# Patient Record
Sex: FEMALE | Race: WHITE | NOT HISPANIC OR LATINO | URBAN - METROPOLITAN AREA
[De-identification: names, ages, dates, MRNs, and addresses within clinical notes are randomized per-mention and may not be internally consistent; named-entity substitution may affect disease eponyms.]

---

## 2017-01-03 ENCOUNTER — INPATIENT (INPATIENT)
Facility: HOSPITAL | Age: 27
LOS: 2 days | Discharge: HOME | DRG: 918 | End: 2017-01-06
Attending: INTERNAL MEDICINE | Admitting: INTERNAL MEDICINE
Payer: COMMERCIAL

## 2017-01-03 VITALS
DIASTOLIC BLOOD PRESSURE: 98 MMHG | HEART RATE: 108 BPM | SYSTOLIC BLOOD PRESSURE: 159 MMHG | TEMPERATURE: 99 F | RESPIRATION RATE: 26 BRPM | OXYGEN SATURATION: 96 %

## 2017-01-03 DIAGNOSIS — Z86.59 PERSONAL HISTORY OF OTHER MENTAL AND BEHAVIORAL DISORDERS: ICD-10-CM

## 2017-01-03 DIAGNOSIS — G54.0 BRACHIAL PLEXUS DISORDERS: ICD-10-CM

## 2017-01-03 DIAGNOSIS — T43.621A POISONING BY AMPHETAMINES, ACCIDENTAL (UNINTENTIONAL), INITIAL ENCOUNTER: ICD-10-CM

## 2017-01-03 DIAGNOSIS — F19.90 OTHER PSYCHOACTIVE SUBSTANCE USE, UNSPECIFIED, UNCOMPLICATED: ICD-10-CM

## 2017-01-03 DIAGNOSIS — Z41.8 ENCOUNTER FOR OTHER PROCEDURES FOR PURPOSES OTHER THAN REMEDYING HEALTH STATE: ICD-10-CM

## 2017-01-03 DIAGNOSIS — I82.629 ACUTE EMBOLISM AND THROMBOSIS OF DEEP VEINS OF UNSPECIFIED UPPER EXTREMITY: ICD-10-CM

## 2017-01-03 DIAGNOSIS — R63.8 OTHER SYMPTOMS AND SIGNS CONCERNING FOOD AND FLUID INTAKE: ICD-10-CM

## 2017-01-03 DIAGNOSIS — R00.0 TACHYCARDIA, UNSPECIFIED: ICD-10-CM

## 2017-01-03 DIAGNOSIS — F19.950 OTHER PSYCHOACTIVE SUBSTANCE USE, UNSPECIFIED WITH PSYCHOACTIVE SUBSTANCE-INDUCED PSYCHOTIC DISORDER WITH DELUSIONS: ICD-10-CM

## 2017-01-03 LAB
ALBUMIN SERPL ELPH-MCNC: 4.1 G/DL — SIGNIFICANT CHANGE UP (ref 3.4–5)
ALBUMIN SERPL ELPH-MCNC: 4.6 G/DL — SIGNIFICANT CHANGE UP (ref 3.4–5)
ALP SERPL-CCNC: 82 U/L — SIGNIFICANT CHANGE UP (ref 40–120)
ALP SERPL-CCNC: 84 U/L — SIGNIFICANT CHANGE UP (ref 40–120)
ALT FLD-CCNC: 29 U/L — SIGNIFICANT CHANGE UP (ref 12–42)
ALT FLD-CCNC: 36 U/L — SIGNIFICANT CHANGE UP (ref 12–42)
ANION GAP SERPL CALC-SCNC: 12 MMOL/L — SIGNIFICANT CHANGE UP (ref 9–16)
ANION GAP SERPL CALC-SCNC: 8 MMOL/L — LOW (ref 9–16)
APPEARANCE UR: CLEAR — SIGNIFICANT CHANGE UP
APTT BLD: 29.1 SEC — SIGNIFICANT CHANGE UP (ref 27.5–37.4)
AST SERPL-CCNC: 21 U/L — SIGNIFICANT CHANGE UP (ref 15–37)
AST SERPL-CCNC: 33 U/L — SIGNIFICANT CHANGE UP (ref 15–37)
BASOPHILS NFR BLD AUTO: 0.5 % — SIGNIFICANT CHANGE UP (ref 0–2)
BILIRUB SERPL-MCNC: 0.4 MG/DL — SIGNIFICANT CHANGE UP (ref 0.2–1.2)
BILIRUB SERPL-MCNC: 1 MG/DL — SIGNIFICANT CHANGE UP (ref 0.2–1.2)
BILIRUB UR-MCNC: NEGATIVE — SIGNIFICANT CHANGE UP
BUN SERPL-MCNC: 4 MG/DL — LOW (ref 7–23)
BUN SERPL-MCNC: 5 MG/DL — LOW (ref 7–23)
CALCIUM SERPL-MCNC: 7.8 MG/DL — LOW (ref 8.5–10.5)
CALCIUM SERPL-MCNC: 9.5 MG/DL — SIGNIFICANT CHANGE UP (ref 8.5–10.5)
CHLORIDE SERPL-SCNC: 105 MMOL/L — SIGNIFICANT CHANGE UP (ref 96–108)
CHLORIDE SERPL-SCNC: 105 MMOL/L — SIGNIFICANT CHANGE UP (ref 96–108)
CO2 SERPL-SCNC: 22 MMOL/L — SIGNIFICANT CHANGE UP (ref 22–31)
CO2 SERPL-SCNC: 25 MMOL/L — SIGNIFICANT CHANGE UP (ref 22–31)
COLOR SPEC: YELLOW — SIGNIFICANT CHANGE UP
CREAT SERPL-MCNC: 0.68 MG/DL — SIGNIFICANT CHANGE UP (ref 0.5–1.3)
CREAT SERPL-MCNC: 0.7 MG/DL — SIGNIFICANT CHANGE UP (ref 0.5–1.3)
DIFF PNL FLD: (no result)
EOSINOPHIL NFR BLD AUTO: 0.5 % — SIGNIFICANT CHANGE UP (ref 0–6)
GLUCOSE SERPL-MCNC: 118 MG/DL — HIGH (ref 70–99)
GLUCOSE SERPL-MCNC: 133 MG/DL — HIGH (ref 70–99)
GLUCOSE UR QL: NEGATIVE — SIGNIFICANT CHANGE UP
HCG SERPL-ACNC: <1 MIU/ML — SIGNIFICANT CHANGE UP
HCT VFR BLD CALC: 43.5 % — SIGNIFICANT CHANGE UP (ref 34.5–45)
HGB BLD-MCNC: 14.8 G/DL — SIGNIFICANT CHANGE UP (ref 11.5–15.5)
INR BLD: 1.14 — SIGNIFICANT CHANGE UP (ref 0.88–1.16)
KETONES UR-MCNC: NEGATIVE — SIGNIFICANT CHANGE UP
LACTATE SERPL-SCNC: 2.4 MMOL/L — HIGH (ref 0.5–2)
LEUKOCYTE ESTERASE UR-ACNC: NEGATIVE — SIGNIFICANT CHANGE UP
LYMPHOCYTES # BLD AUTO: 24.3 % — SIGNIFICANT CHANGE UP (ref 13–44)
MAGNESIUM SERPL-MCNC: 2.2 MG/DL — SIGNIFICANT CHANGE UP (ref 1.6–2.4)
MCHC RBC-ENTMCNC: 29 PG — SIGNIFICANT CHANGE UP (ref 27–34)
MCHC RBC-ENTMCNC: 34 G/DL — SIGNIFICANT CHANGE UP (ref 32–36)
MCV RBC AUTO: 85.1 FL — SIGNIFICANT CHANGE UP (ref 80–100)
MONOCYTES NFR BLD AUTO: 10.2 % — SIGNIFICANT CHANGE UP (ref 2–14)
NEUTROPHILS NFR BLD AUTO: 64.5 % — SIGNIFICANT CHANGE UP (ref 43–77)
NITRITE UR-MCNC: NEGATIVE — SIGNIFICANT CHANGE UP
PCP SPEC-MCNC: SIGNIFICANT CHANGE UP
PH UR: 7 — SIGNIFICANT CHANGE UP (ref 4–8)
PLATELET # BLD AUTO: 514 K/UL — HIGH (ref 150–400)
POTASSIUM SERPL-MCNC: 3.3 MMOL/L — LOW (ref 3.5–5.3)
POTASSIUM SERPL-MCNC: 3.6 MMOL/L — SIGNIFICANT CHANGE UP (ref 3.5–5.3)
POTASSIUM SERPL-SCNC: 3.3 MMOL/L — LOW (ref 3.5–5.3)
POTASSIUM SERPL-SCNC: 3.6 MMOL/L — SIGNIFICANT CHANGE UP (ref 3.5–5.3)
PROT SERPL-MCNC: 7.5 G/DL — SIGNIFICANT CHANGE UP (ref 6.4–8.2)
PROT SERPL-MCNC: 8.7 G/DL — HIGH (ref 6.4–8.2)
PROT UR-MCNC: NEGATIVE MG/DL — SIGNIFICANT CHANGE UP
PROTHROM AB SERPL-ACNC: 12.7 SEC — SIGNIFICANT CHANGE UP (ref 10–13.1)
RBC # BLD: 5.11 M/UL — SIGNIFICANT CHANGE UP (ref 3.8–5.2)
RBC # FLD: 12.9 % — SIGNIFICANT CHANGE UP (ref 10.3–16.9)
SODIUM SERPL-SCNC: 138 MMOL/L — SIGNIFICANT CHANGE UP (ref 135–145)
SODIUM SERPL-SCNC: 139 MMOL/L — SIGNIFICANT CHANGE UP (ref 135–145)
SP GR SPEC: 1.02 — SIGNIFICANT CHANGE UP (ref 1–1.03)
T4 AB SER-ACNC: 6.84 UG/DL — SIGNIFICANT CHANGE UP (ref 3.17–11.72)
TSH SERPL-MCNC: 3.34 UIU/ML — SIGNIFICANT CHANGE UP (ref 0.35–4.94)
TSH SERPL-MCNC: 4.38 UIU/ML — SIGNIFICANT CHANGE UP (ref 0.35–4.94)
UROBILINOGEN FLD QL: 0.2 E.U./DL — SIGNIFICANT CHANGE UP
WBC # BLD: 10.9 K/UL — HIGH (ref 3.8–10.5)
WBC # FLD AUTO: 10.9 K/UL — HIGH (ref 3.8–10.5)

## 2017-01-03 PROCEDURE — 99291 CRITICAL CARE FIRST HOUR: CPT | Mod: 25

## 2017-01-03 PROCEDURE — 71275 CT ANGIOGRAPHY CHEST: CPT | Mod: 26

## 2017-01-03 PROCEDURE — 99223 1ST HOSP IP/OBS HIGH 75: CPT | Mod: GC

## 2017-01-03 PROCEDURE — 93010 ELECTROCARDIOGRAM REPORT: CPT

## 2017-01-03 PROCEDURE — 71010: CPT | Mod: 26

## 2017-01-03 PROCEDURE — 99223 1ST HOSP IP/OBS HIGH 75: CPT

## 2017-01-03 RX ORDER — HEPARIN SODIUM 5000 [USP'U]/ML
5000 INJECTION INTRAVENOUS; SUBCUTANEOUS EVERY 8 HOURS
Qty: 0 | Refills: 0 | Status: DISCONTINUED | OUTPATIENT
Start: 2017-01-03 | End: 2017-01-06

## 2017-01-03 RX ORDER — INFLUENZA VIRUS VACCINE 15; 15; 15; 15 UG/.5ML; UG/.5ML; UG/.5ML; UG/.5ML
0.5 SUSPENSION INTRAMUSCULAR ONCE
Qty: 0 | Refills: 0 | Status: COMPLETED | OUTPATIENT
Start: 2017-01-03 | End: 2017-01-03

## 2017-01-03 RX ORDER — POTASSIUM CHLORIDE 20 MEQ
40 PACKET (EA) ORAL ONCE
Qty: 0 | Refills: 0 | Status: COMPLETED | OUTPATIENT
Start: 2017-01-03 | End: 2017-01-03

## 2017-01-03 RX ORDER — METOPROLOL TARTRATE 50 MG
2.5 TABLET ORAL ONCE
Qty: 0 | Refills: 0 | Status: COMPLETED | OUTPATIENT
Start: 2017-01-03 | End: 2017-01-03

## 2017-01-03 RX ORDER — SODIUM CHLORIDE 9 MG/ML
1000 INJECTION INTRAMUSCULAR; INTRAVENOUS; SUBCUTANEOUS
Qty: 0 | Refills: 0 | Status: DISCONTINUED | OUTPATIENT
Start: 2017-01-03 | End: 2017-01-05

## 2017-01-03 RX ORDER — QUETIAPINE FUMARATE 200 MG/1
50 TABLET, FILM COATED ORAL EVERY 6 HOURS
Qty: 0 | Refills: 0 | Status: DISCONTINUED | OUTPATIENT
Start: 2017-01-03 | End: 2017-01-06

## 2017-01-03 RX ORDER — METOPROLOL TARTRATE 50 MG
5 TABLET ORAL EVERY 6 HOURS
Qty: 0 | Refills: 0 | Status: DISCONTINUED | OUTPATIENT
Start: 2017-01-03 | End: 2017-01-04

## 2017-01-03 RX ORDER — ATENOLOL 25 MG/1
0 TABLET ORAL
Qty: 0 | Refills: 0 | COMMUNITY

## 2017-01-03 RX ORDER — SERTRALINE 25 MG/1
0 TABLET, FILM COATED ORAL
Qty: 0 | Refills: 0 | COMMUNITY

## 2017-01-03 RX ORDER — QUETIAPINE FUMARATE 200 MG/1
50 TABLET, FILM COATED ORAL EVERY 6 HOURS
Qty: 0 | Refills: 0 | Status: DISCONTINUED | OUTPATIENT
Start: 2017-01-03 | End: 2017-01-03

## 2017-01-03 RX ORDER — SODIUM CHLORIDE 9 MG/ML
2000 INJECTION INTRAMUSCULAR; INTRAVENOUS; SUBCUTANEOUS ONCE
Qty: 0 | Refills: 0 | Status: COMPLETED | OUTPATIENT
Start: 2017-01-03 | End: 2017-01-03

## 2017-01-03 RX ORDER — SODIUM CHLORIDE 9 MG/ML
1000 INJECTION INTRAMUSCULAR; INTRAVENOUS; SUBCUTANEOUS ONCE
Qty: 0 | Refills: 0 | Status: COMPLETED | OUTPATIENT
Start: 2017-01-03 | End: 2017-01-03

## 2017-01-03 RX ORDER — ONDANSETRON 8 MG/1
4 TABLET, FILM COATED ORAL ONCE
Qty: 0 | Refills: 0 | Status: COMPLETED | OUTPATIENT
Start: 2017-01-03 | End: 2017-01-03

## 2017-01-03 RX ORDER — QUETIAPINE FUMARATE 200 MG/1
0 TABLET, FILM COATED ORAL
Qty: 0 | Refills: 0 | COMMUNITY

## 2017-01-03 RX ORDER — QUETIAPINE FUMARATE 200 MG/1
25 TABLET, FILM COATED ORAL EVERY 6 HOURS
Qty: 0 | Refills: 0 | Status: DISCONTINUED | OUTPATIENT
Start: 2017-01-03 | End: 2017-01-03

## 2017-01-03 RX ADMIN — Medication 0.5 MILLIGRAM(S): at 22:37

## 2017-01-03 RX ADMIN — QUETIAPINE FUMARATE 25 MILLIGRAM(S): 200 TABLET, FILM COATED ORAL at 14:39

## 2017-01-03 RX ADMIN — SODIUM CHLORIDE 4000 MILLILITER(S): 9 INJECTION INTRAMUSCULAR; INTRAVENOUS; SUBCUTANEOUS at 03:17

## 2017-01-03 RX ADMIN — SODIUM CHLORIDE 120 MILLILITER(S): 9 INJECTION INTRAMUSCULAR; INTRAVENOUS; SUBCUTANEOUS at 21:54

## 2017-01-03 RX ADMIN — Medication 40 MILLIEQUIVALENT(S): at 15:36

## 2017-01-03 RX ADMIN — Medication 40 MILLIEQUIVALENT(S): at 19:59

## 2017-01-03 RX ADMIN — SODIUM CHLORIDE 120 MILLILITER(S): 9 INJECTION INTRAMUSCULAR; INTRAVENOUS; SUBCUTANEOUS at 12:56

## 2017-01-03 RX ADMIN — HEPARIN SODIUM 5000 UNIT(S): 5000 INJECTION INTRAVENOUS; SUBCUTANEOUS at 13:48

## 2017-01-03 RX ADMIN — Medication 2.5 MILLIGRAM(S): at 15:35

## 2017-01-03 RX ADMIN — Medication 5 MILLIGRAM(S): at 22:00

## 2017-01-03 RX ADMIN — Medication 1 MILLIGRAM(S): at 03:15

## 2017-01-03 RX ADMIN — Medication 0.5 MILLIGRAM(S): at 17:41

## 2017-01-03 RX ADMIN — Medication 2.5 MILLIGRAM(S): at 16:29

## 2017-01-03 RX ADMIN — QUETIAPINE FUMARATE 50 MILLIGRAM(S): 200 TABLET, FILM COATED ORAL at 22:40

## 2017-01-03 RX ADMIN — Medication 1 MILLIGRAM(S): at 05:23

## 2017-01-03 RX ADMIN — ONDANSETRON 4 MILLIGRAM(S): 8 TABLET, FILM COATED ORAL at 05:19

## 2017-01-03 RX ADMIN — SODIUM CHLORIDE 2000 MILLILITER(S): 9 INJECTION INTRAMUSCULAR; INTRAVENOUS; SUBCUTANEOUS at 06:39

## 2017-01-03 RX ADMIN — Medication 1 MILLIGRAM(S): at 19:59

## 2017-01-03 RX ADMIN — HEPARIN SODIUM 5000 UNIT(S): 5000 INJECTION INTRAVENOUS; SUBCUTANEOUS at 22:00

## 2017-01-03 NOTE — PATIENT PROFILE ADULT. - PROVIDE DETAILS
"My neighbors, they're just crazy. They tried to get me involved in money laundering. They're just drug dealers."

## 2017-01-03 NOTE — CONSULT NOTE ADULT - PROBLEM SELECTOR RECOMMENDATION 2
Patient with hx of illicit substance use and now with paranoia likely 2/2 amphetamine.   - Recommend psychiatry consult for evaluation

## 2017-01-03 NOTE — ED PROVIDER NOTE - NEUROLOGICAL, MLM
Alert and oriented, no focal deficits, no motor or sensory deficits. Alert and oriented, no focal deficits, no motor or sensory deficits. No clonus.  DTR: BR and biceps + 2 bilat; Patellar and Achilles + 4 bilaterally

## 2017-01-03 NOTE — CONSULT NOTE ADULT - ATTENDING COMMENTS
Ms Pinon is a 27 y/o female who presented to the ED after using estacy, marijuana and alcohol after which she had tingling around the mouth and shortness of breath  A/P  -depression  -recreational drug abuse  -sinus tachycardia  -paranoia  >haldol prn agitation  >psych evaluation  >hold seroquel  >hold xoloft  >IVF  >she may eat  DVT prophylaxis  >[facility] for close observation

## 2017-01-03 NOTE — ED PROVIDER NOTE - ATTENDING CONTRIBUTION TO CARE
26F hx thoracic outlet syndrome (s/p surgical removal of cervical rib), LUE DVT (not currently on anticoagulation), c/o palpitations, SOB. pt states she took an ecstasy tablet.  states feels like it is difficult to take a deep breath.  no LE swelling. no n/v/d. no fevers.   gen- nad  heent- ncat, clear conj, pupils large, equal reactive 7mm  cv -tachycardic  lungs -ctab  abd - soft, nt, nd  ext -wwp, no edema  neuro -aox3, steady gait, carlton  anxious appearing, tachycardic 100s-160s, however sinus on EKG, no PE on CT scan, labs checked, tachycardia improved with ativan, likely toxic reaction, will continue iv hydration and monitoring.

## 2017-01-03 NOTE — ED PROVIDER NOTE - MUSCULOSKELETAL, MLM
Spine appears normal, range of motion is not limited, no muscle or joint tenderness.  No upper or lower extremity edema.

## 2017-01-03 NOTE — PATIENT PROFILE ADULT. - NS TRANSFER PATIENT BELONGINGS
Clothing/iPhone 6, wallet, $200 cash, purse, bracelet/Jewelry/Money (specify)/Cell Phone/PDA (specify)

## 2017-01-03 NOTE — H&P ADULT. - ASSESSMENT
26F with a medical history of thoracic outlet syndrome (s/p pectoral resection and cervical rib resection, 11/16), unspecified anxiety disorder, major depressive disorder (on Zoloft, reported Seroquel to another provider), DVT of the upper extremity, and recreational substance use who presented to [facility] for evaluation of shortness of breath and palpitations, likely secondary to amphetamine overdose.

## 2017-01-03 NOTE — ED PROVIDER NOTE - CONSTITUTIONAL, MLM
normal... Well appearing, well nourished, awake, alert, oriented to person, place, time/situation. Very anxious, but NAD

## 2017-01-03 NOTE — H&P ADULT. - PROBLEM SELECTOR PLAN 5
regular diet  replete electrolytes as dictated by laboratory results    DISPO: pending clinical improvement  FULL CODE

## 2017-01-03 NOTE — PROGRESS NOTE ADULT - ASSESSMENT
IMP:  26-year-old female with hx of depression though denies prior inpt admits, suicidality or substance abuse hx, now admitted s/p ingestion of multiple drugs including   DX:    REC:  Observation Status:  Additional Work-up:  Medication Recs:  Follow-up: IMP:  26-year-old female with hx of depression though denies prior inpt admits, suicidality or substance abuse hx, now admitted s/p ingestion of multiple drugs including ecstasy and MJ  DX:    REC:  Observation Status:  Additional Work-up:  Medication Recs:  Follow-up: IMP:  26-year-old female with hx of depression though denies prior inpt admits, suicidality or substance abuse hx, now admitted s/p stated ingestion of ecstasy, MJ, and cocaine (though utox + only for amphetamines), with paranoid delusions, auditory hallucinations and agitation. Pt's current presentation likely substance-induced, due to ecstasy and perhaps other undetectable substances as well.        REC:  Observation Status:  Additional Work-up:  Medication Recs:  Follow-up: IMP:  26-year-old female with hx of depression though denies prior inpt admits, suicidality or substance abuse hx, now admitted s/p stated ingestion of ecstasy, MJ, and cocaine (though utox + only for amphetamines), with paranoid delusions, auditory hallucinations and agitation. Pt's current presentation likely substance-induced, due to ecstasy and perhaps other undetectable substances as well.

## 2017-01-03 NOTE — CONSULT NOTE ADULT - ASSESSMENT
A/P: 26 year-old F with PMHx of thoracic outlet syndrome, s/p pectoral resection and cervical rib resection in Nov 2016, anxiety, depression, substance abuse, reportedly took "Ecstasy", U tox positive for amphetamine and benzos (was given Ativan in ED), ICU consulted for tachycardia and concerning serotonin syndrome.

## 2017-01-03 NOTE — CONSULT NOTE ADULT - PROBLEM SELECTOR RECOMMENDATION 9
EKG with sinus tachycardia. Sympathomimetic response likely 2/2 Amphetamine in a patient with hx of illicit substance use. Cannot r/o other drugs use despite U tox only positive for Benzodiazepines and Amphetamines. Also concerning for serotonin syndrome as patient takes Zoloft, however patient is not hyperthermic, no tremors or clonus observed on exam. No chest pain or dyspnea.   - Recommend monitoring patient in [place].  - Continue with IVF.  - Hold Zoloft and Seroquel.  - If agitated can give Haldol PRN

## 2017-01-03 NOTE — H&P ADULT. - PROBLEM SELECTOR PLAN 2
Reported operations in past two months to relieve obstruction. Stable at time.  - follow up with her provider in [place] re: history

## 2017-01-03 NOTE — PROGRESS NOTE ADULT - SUBJECTIVE AND OBJECTIVE BOX
HPI:  Ms. Pinon is a 26F, visiting from [place], with a medical history of thoracic outlet syndrome (s/p pectoral resection and cervical rib resection, 11/16), unspecified anxiety disorder, major depressive disorder (on Zoloft, reported Seroquel to another provider), DVT of the upper extremity, and recreational substance use who presented to [facility] for evaluation of shortness of breath and palpitations. According to the patient, she was at a house party (where she consumed alcohol and smoked marijuana) at which point she swallowed a white granular powder that was reported to be ecstasy. Several hours later, she was noted as being "really sweaty" by another person; the patient attempted to lie down, but noticed that she had difficulty breathing in addition to a sensation of her "jaw being locked". No chest pain. At this point, she came to the ED for further evaluation.    In the ED, her vitals were remarkable for tachycardia in 150-160s (sinus tachycardia on EKG). ICU was consulted for possible serotonin syndrome, given the patient takes sertraline as an outpatient. She received 2L NS and Ativan 1mg x2, which reportedly helped alleviate her symptom severity. (03 Jan 2017 11:25)      Psych HPI: Consult requested for evaluation of above, as well as agitation, extreme paranoia and ?hallucinations.    Past Psych Hx: As above. Pt denies prior inpt admits, suicidality. Reports she's been prescribed Zoloft 50 mg po qdaily by her PMD, Dr. Fine, in [place]    PAST MEDICAL & SURGICAL HISTORY:  Thoracic outlet syndrome associated with cervical rib  DVT of upper extremity (deep vein thrombosis)      Allergies    trazodone (Anaphylaxis)    Intolerances      MEDICATIONS  (STANDING):  sodium chloride 0.9%. 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 Never ; no kids.    Sub Abuse Hx: Denies prior use of any drugs, stating this was the first time she used anything.    FPH: Brother: Depression    ROS: Psych: See HPI.  All other systems negative.                          14.8   10.9  )-----------( 514      ( 03 Jan 2017 03:19 )             43.5   03 Jan 2017 18:23    138    |  105    |  4      ----------------------------<  133    3.3     |  25     |  0.70     Ca    7.8        03 Jan 2017 18:23  Mg     2.2       03 Jan 2017 03:19    TPro  7.5    /  Alb  4.1    /  TBili  1.0    /  DBili  x      /  AST  21     /  ALT  29     /  AlkPhos  82     03 Jan 2017 18:23    TSH:     Utox:    Imaging:  Other Tests:        Collateral:    EXAM:  Vital Signs Last 24 Hrs  T(C): 36.9, Max: 37 (01-03 @ 01:57)  T(F): 98.4, Max: 98.6 (01-03 @ 01:57)  HR: 104 (97 - 156)  BP: 147/96 (130/80 - 159/98)  BP(mean): 117 (103 - 127)  RR: 20 (18 - 26)  SpO2: 97% (96% - 100%)    Gen Appearance: Looks stated age. Slightly dishevelled. +contusions on chin. Hyperalert and easily distractible due to paranoia  Gait/Station/Muscle Tone: In bed  Abnl Movements: Restless due to hyperalert and paranoid state.  Speech: Reg rate, vol  TP; Illogical, perseverative re her friends calling her mother, her friends using her computer.  TC: Denies SI/HI. ?auditory hallucinations regarding others talking about her. Denies VH. +++paranoid ideation.  Mood: "Nervous."  Affect: Mood congruent, paranoid, ?internally preoccupied.  Consciousness/orientation: A and O x 4  Memory: Grossly Intact  Recent:Grossly Intact    Remote: Grossly Intact  Attention/Concentration: Easily distractible.  Insight: Poor  Judgment: Poor    Suicide Risk Assessment: Pt denies past or current suicidal ideation/intent or plan. HPI:  Ms. Pinon is a 26F, visiting from [place], with a medical history of thoracic outlet syndrome (s/p pectoral resection and cervical rib resection, 11/16), unspecified anxiety disorder, major depressive disorder (on Zoloft, reported Seroquel to another provider), DVT of the upper extremity, and recreational substance use who presented to [facility] for evaluation of shortness of breath and palpitations. According to the patient, she was at a house party (where she consumed alcohol and smoked marijuana) at which point she swallowed a white granular powder that was reported to be ecstasy. Several hours later, she was noted as being "really sweaty" by another person; the patient attempted to lie down, but noticed that she had difficulty breathing in addition to a sensation of her "jaw being locked". No chest pain. At this point, she came to the ED for further evaluation.    In the ED, her vitals were remarkable for tachycardia in 150-160s (sinus tachycardia on EKG). ICU was consulted for possible serotonin syndrome, given the patient takes sertraline as an outpatient. She received 2L NS and Ativan 1mg x2, which reportedly helped alleviate her symptom severity. (03 Jan 2017 11:25)      Psych HPI: Consult requested for evaluation of above, as well as agitation, extreme paranoia and ?hallucinations.    On interview now, pt remains agitated, restless and paranoid, despite rx with seroquel 25 mg and lorazepam 1.5 mg. She acknowledges using cocaine, MJ and "Dee Dee" (ecstasy). She is very focused on concern that her friends will contact her mother, also that her friends have hijacked her computer, used her password and are sending messages.     Past Psych Hx: As above. Pt denies prior inpt admits, suicidality. Reports she's been prescribed Zoloft 50 mg po qdaily by her PMD, Dr. Fine, in [place]    PAST MEDICAL & SURGICAL HISTORY:  Thoracic outlet syndrome associated with cervical rib  DVT of upper extremity (deep vein thrombosis)      Allergies    trazodone (Anaphylaxis)    Intolerances      MEDICATIONS  (STANDING):  sodium chloride 0.9%. 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    Sub Abuse Hx: Denies prior use of any drugs, stating this was the first time she used anything.    FPH: Brother: Depression    ROS: Psych: See HPI.  All other systems negative.                          14.8   10.9  )-----------( 514      ( 03 Jan 2017 03:19 )             43.5   03 Jan 2017 18:23    138    |  105    |  4      ----------------------------<  133    3.3     |  25     |  0.70     Ca    7.8        03 Jan 2017 18:23  Mg     2.2       03 Jan 2017 03:19    TPro  7.5    /  Alb  4.1    /  TBili  1.0    /  DBili  x      /  AST  21     /  ALT  29     /  AlkPhos  82     03 Jan 2017 18:23    TSH:     Utox:    Imaging:  Other Tests:        Collateral:    EXAM:  Vital Signs Last 24 Hrs  T(C): 36.9, Max: 37 (01-03 @ 01:57)  T(F): 98.4, Max: 98.6 (01-03 @ 01:57)  HR: 104 (97 - 156)  BP: 147/96 (130/80 - 159/98)  BP(mean): 117 (103 - 127)  RR: 20 (18 - 26)  SpO2: 97% (96% - 100%)    Gen Appearance: Looks stated age. Slightly dishevelled. +contusions on chin. Hyperalert and easily distractible due to paranoia  Gait/Station/Muscle Tone: In bed  Abnl Movements: Restless due to hyperalert and paranoid state.  Speech: Reg rate, vol  TP; Illogical, perseverative re her friends calling her mother, her friends using her computer.  TC: Denies SI/HI. ?auditory hallucinations regarding others talking about her. Denies VH. +++paranoid ideation.  Mood: "Nervous."  Affect: Mood congruent, paranoid, ?internally preoccupied.  Consciousness/orientation: A and O x 4  Memory: Grossly Intact  Recent:Grossly Intact    Remote: Grossly Intact  Attention/Concentration: Easily distractible.  Insight: Poor  Judgment: Poor    Suicide Risk Assessment: Pt denies past or current suicidal ideation/intent or plan. HPI:  Ms. Pinon is a 26F, visiting from [place], with a medical history of thoracic outlet syndrome (s/p pectoral resection and cervical rib resection, 11/16), unspecified anxiety disorder, major depressive disorder (on Zoloft, reported Seroquel to another provider), DVT of the upper extremity, and recreational substance use who presented to [facility] for evaluation of shortness of breath and palpitations. According to the patient, she was at a house party (where she consumed alcohol and smoked marijuana) at which point she swallowed a white granular powder that was reported to be ecstasy. Several hours later, she was noted as being "really sweaty" by another person; the patient attempted to lie down, but noticed that she had difficulty breathing in addition to a sensation of her "jaw being locked". No chest pain. At this point, she came to the ED for further evaluation.    In the ED, her vitals were remarkable for tachycardia in 150-160s (sinus tachycardia on EKG). ICU was consulted for possible serotonin syndrome, given the patient takes sertraline as an outpatient. She received 2L NS and Ativan 1mg x2, which reportedly helped alleviate her symptom severity. (03 Jan 2017 11:25)      Psych HPI: Consult requested for evaluation of above, as well as agitation, extreme paranoia and ?hallucinations.    On interview now, pt remains agitated, restless and paranoid, despite rx with seroquel 25 mg and lorazepam 1.5 mg. She acknowledges using cocaine, MJ and "Dee Dee" (ecstasy). She is very focused on concern that her friends will contact her mother, also that her friends have hijacked her computer, used her password and are sending messages.     Past Psych Hx: As above. Pt denies prior inpt admits, suicidality. Reports she's been prescribed Zoloft 50 mg po qdaily by her PMD, Dr. Fine, in [place]    PAST MEDICAL & SURGICAL HISTORY:  Thoracic outlet syndrome associated with cervical rib  DVT of upper extremity (deep vein thrombosis)      Allergies    trazodone (Anaphylaxis)    Intolerances      MEDICATIONS  (STANDING):  sodium chloride 0.9%. 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    Sub Abuse Hx: Denies prior use of any drugs, stating this was the first time she used anything.    FPH: Brother: Depression    ROS: Psych: See HPI.  All other systems negative.                          14.8   10.9  )-----------( 514      ( 03 Jan 2017 03:19 )             43.5   03 Jan 2017 18:23    138    |  105    |  4      ----------------------------<  133    3.3     |  25     |  0.70     Ca    7.8        03 Jan 2017 18:23  Mg     2.2       03 Jan 2017 03:19    TPro  7.5    /  Alb  4.1    /  TBili  1.0    /  DBili  x      /  AST  21     /  ALT  29     /  AlkPhos  82     03 Jan 2017 18:23    Utox: + benzos and amphetamines    Imaging:  Other Tests:        Collateral:    EXAM:  Vital Signs Last 24 Hrs  T(C): 36.9, Max: 37 (01-03 @ 01:57)  T(F): 98.4, Max: 98.6 (01-03 @ 01:57)  HR: 104 (97 - 156)  BP: 147/96 (130/80 - 159/98)  BP(mean): 117 (103 - 127)  RR: 20 (18 - 26)  SpO2: 97% (96% - 100%)    Gen Appearance: Looks stated age. Slightly dishevelled. +contusions on chin. Hyperalert and easily distractible due to paranoia  Gait/Station/Muscle Tone: In bed  Abnl Movements: Restless due to hyperalert and paranoid state.  Speech: Reg rate, vol  TP; Illogical, perseverative re her friends calling her mother, her friends using her computer.  TC: Denies SI/HI. ?auditory hallucinations regarding others talking about her. Denies VH. +++paranoid ideation.  Mood: "Nervous."  Affect: Mood congruent, paranoid, ?internally preoccupied.  Consciousness/orientation: A and O x 4  Memory: Grossly Intact  Recent:Grossly Intact    Remote: Grossly Intact  Attention/Concentration: Easily distractible.  Insight: Poor  Judgment: Poor    Suicide Risk Assessment: Pt denies past or current suicidal ideation/intent or plan. HPI:  Ms. Pinon is a 26F, visiting from [place], with a medical history of thoracic outlet syndrome (s/p pectoral resection and cervical rib resection, 11/16), unspecified anxiety disorder, major depressive disorder (on Zoloft, reported Seroquel to another provider), DVT of the upper extremity, and recreational substance use who presented to [facility] for evaluation of shortness of breath and palpitations. According to the patient, she was at a house party (where she consumed alcohol and smoked marijuana) at which point she swallowed a white granular powder that was reported to be ecstasy. Several hours later, she was noted as being "really sweaty" by another person; the patient attempted to lie down, but noticed that she had difficulty breathing in addition to a sensation of her "jaw being locked". No chest pain. At this point, she came to the ED for further evaluation.    In the ED, her vitals were remarkable for tachycardia in 150-160s (sinus tachycardia on EKG). ICU was consulted for possible serotonin syndrome, given the patient takes sertraline as an outpatient. She received 2L NS and Ativan 1mg x2, which reportedly helped alleviate her symptom severity. (03 Jan 2017 11:25)      Psych HPI: Consult requested for evaluation of above, as well as agitation, extreme paranoia and ?hallucinations.    On interview now, pt remains agitated, restless and paranoid, despite rx with seroquel 25 mg and lorazepam 1.5 mg. She states she used cocaine, MJ and "Dee Dee" (ecstasy) (though utox + only for amphetamines (and benzos likely given in the ED). She is very focused on concern that her friends will contact her mother, also that her friends have hijacked her computer, used her password and are sending messages.     Past Psych Hx: As above. Pt denies prior inpt admits, suicidality. Reports she's been prescribed Zoloft 50 mg po qdaily by her PMD, Dr. Fine, in [place]    PAST MEDICAL & SURGICAL HISTORY:  Thoracic outlet syndrome associated with cervical rib  DVT of upper extremity (deep vein thrombosis)      Allergies    trazodone (Anaphylaxis)    Intolerances      MEDICATIONS  (STANDING):  sodium chloride 0.9%. 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    Sub Abuse Hx: Denies prior use of any drugs, stating this was the first time she used anything.    FPH: Brother: Depression    ROS: Psych: See HPI.  All other systems negative.                          14.8   10.9  )-----------( 514      ( 03 Jan 2017 03:19 )             43.5   03 Jan 2017 18:23    138    |  105    |  4      ----------------------------<  133    3.3     |  25     |  0.70     Ca    7.8        03 Jan 2017 18:23  Mg     2.2       03 Jan 2017 03:19    TPro  7.5    /  Alb  4.1    /  TBili  1.0    /  DBili  x      /  AST  21     /  ALT  29     /  AlkPhos  82     03 Jan 2017 18:23    Utox: + benzos and amphetamines    Imaging:  Other Tests:        Collateral:    EXAM:  Vital Signs Last 24 Hrs  T(C): 36.9, Max: 37 (01-03 @ 01:57)  T(F): 98.4, Max: 98.6 (01-03 @ 01:57)  HR: 104 (97 - 156)  BP: 147/96 (130/80 - 159/98)  BP(mean): 117 (103 - 127)  RR: 20 (18 - 26)  SpO2: 97% (96% - 100%)    Gen Appearance: Looks stated age. Slightly dishevelled. +contusions on chin. Hyperalert and easily distractible due to paranoia  Gait/Station/Muscle Tone: In bed  Abnl Movements: Restless due to hyperalert and paranoid state.  Speech: Reg rate, vol  TP; Illogical, perseverative re her friends calling her mother, her friends using her computer.  TC: Denies SI/HI. ?auditory hallucinations regarding others talking about her. Denies VH. +++paranoid ideation.  Mood: "Nervous."  Affect: Mood congruent, paranoid, ?internally preoccupied.  Consciousness/orientation: A and O x 4  Memory: Grossly Intact  Recent:Grossly Intact    Remote: Grossly Intact  Attention/Concentration: Easily distractible.  Insight: Poor  Judgment: Poor    Suicide Risk Assessment: Pt denies past or current suicidal ideation/intent or plan.

## 2017-01-03 NOTE — ED PROVIDER NOTE - MEDICAL DECISION MAKING DETAILS
26 fem c/o SOB; very tachycardic and anxious.  Hx of DVT due to thoracic outlet syndrome, recent surgery for same in November 2016.  Consider PE, drug toxicity (ecstacy), and serotonin syndrome (new SSRI).  Very tachycardic (HR range 110s-180); EKG without ischemic changes; BP stable. CXR clear, no PTX.  Will check labs, give ativan and IV fluids, CTA for PE.  Dispo pending results and clinical course. 26 fem c/o SOB; very tachycardic and anxious.  Hx of DVT due to thoracic outlet syndrome, recent surgery for same in November 2016.  Consider PE, drug toxicity (ecstacy), and serotonin syndrome (new SSRI) or anticholinergic syndrome.  Very tachycardic (HR range 110s-180); EKG without ischemic changes; BP stable. CXR clear, no PTX.  Will check labs, give ativan and IV fluids, CTA for PE.  Dispo pending results and clinical course. 26 fem c/o SOB; very tachycardic and anxious.  Hx of DVT due to thoracic outlet syndrome, recent surgery for same in November 2016.  Consider PE, drug toxicity (ecstacy), and serotonin syndrome (new SSRI and hyperreflexic) or anticholinergic syndrome.  Very tachycardic (HR range 110s-180); EKG without ischemic changes; BP stable. CXR clear, no PTX.  Will check labs, give ativan and IV fluids, CTA for PE.  Dispo pending results and clinical course.

## 2017-01-03 NOTE — PATIENT PROFILE ADULT. - --DESCRIBE SURGICAL SITE
sutures noted to the left anterior upper  chest. Edges well approximated. No pain, redness, or drainage.

## 2017-01-03 NOTE — ED ADULT NURSE NOTE - OBJECTIVE STATEMENT
pt was BIBEMS  for AMS. PT SELF REPORTED THAT SHE had marijuana with some alcohol  and that someone gave her a white pill to put under her tongue, pt stated that she started having numbness to her tongue. Pt came in ER noted to be very anxious and agitated. repeating stating that she needs to be " sedated". pt was placed on the cardia monitor  for monitor and was noted to be tachycardiac. ativan was given and EKG done .  pt is presently be eval by ICU

## 2017-01-03 NOTE — ED ADULT NURSE NOTE - CHIEF COMPLAINT QUOTE
pt states "My tongue is swelling up. There's a bump on my tongue that just came about." According to EMS pt took some oral substance similar to ecstasy, drank alcohol and smoked marijuana. Pts bilateral pupils dilated and slightly tachypneic .

## 2017-01-03 NOTE — CONSULT NOTE ADULT - SUBJECTIVE AND OBJECTIVE BOX
INTERVAL HPI/OVERNIGHT EVENTS:    VITAL SIGNS:  T(F): 98.6  HR: 126  BP: 143/97  RR: 20  SpO2: 100%  Wt(kg): --    PHYSICAL EXAM:    Constitutional: NAD, well-groomed, well-developed  Mouth: Dry mucous membrane  HEENT: Dilated pupils reactive to light, clear conjunctiva, EOMI,   Neck: No LAD, No JVD  Back: Normal spine flexure, No CVA tenderness  Respiratory: CTAB  Cardiovascular: S1 and S2, tachycardia  Gastrointestinal: BS+, soft, NT/ND  Extremities: No peripheral edema  Vascular: 2+ peripheral pulses  Neurological: A/O x 3, no focal deficits  Psychiatric: Normal mood, normal affect  Musculoskeletal: 5/5 strength b/l upper and lower extremities  Skin: No rashes    MEDICATIONS  (STANDING):  sodium chloride 0.9% Bolus 1000milliLiter(s) IV Bolus once    MEDICATIONS  (PRN):      Allergies    trazodone (Anaphylaxis)    Intolerances        LABS:                        14.8   10.9  )-----------( 514      ( 2017 03:19 )             43.5     2017 03:19    139    |  105    |  5      ----------------------------<  118    3.6     |  22     |  0.68     Ca    9.5        2017 03:19    TPro  8.7    /  Alb  4.6    /  TBili  0.4    /  DBili  x      /  AST  33     /  ALT  36     /  AlkPhos  84     2017 03:19    PT/INR - ( 2017 03:19 )   PT: 12.7 sec;   INR: 1.14          PTT - ( 2017 03:19 )  PTT:29.1 sec  Urinalysis Basic - ( 2017 04:01 )    Color: Yellow / Appearance: Clear / S.020 / pH: x  Gluc: x / Ketone: NEGATIVE  / Bili: NEGATIVE / Urobili: 0.2 E.U./dL   Blood: x / Protein: NEGATIVE mg/dL / Nitrite: NEGATIVE   Leuk Esterase: NEGATIVE / RBC: 5-10 /HPF / WBC < 5 /HPF   Sq Epi: x / Non Sq Epi: Rare /HPF / Bacteria: Present /HPF        RADIOLOGY & ADDITIONAL TESTS: HPI: 26 year-old F with PMHx of thoracic outlet syndrome, s/p pectoral resection and cervical rib resection in 2016, anxiety, depression, substance abuse presents after taking ecstasy, alcohol, and smoking marijuana. Patient reportedly tried ecstasy (small amount of powder) for the first time last night and also took marijuana and alcohol. A few hours later, patient started feeling sensation of "jaw locking" with difficulty breathing, sweating, paresthesias, and mouth dryness. No chest pain. Denies any fall or loss of consciousness. Denies any fever or chills. Denies any IV drug use. Smokes marijuana occasionally. No hx of EtOH abuse. Of note, patient was started on Zoloft about a few weeks ago and also takes Adderall and Seroquel.   In ED, vitals were remarkable for tachycardia in 150 - 160s (sinus tachycardia on EKG). ICU consulted for tachycardia and concerning for serotonin syndrome. Patient received 2L NS and Ativan 1mg x2. Patient reports feeling better. Denies any chest pain or SOB. No acute complaints.     ROS as per HPI    PAST MEDICAL & SURGICAL HISTORY:  Thoracic outlet syndrome associated with cervical rib  DVT of upper extremity (deep vein thrombosis)  Anxiety/Depression    Social Hx: Illicit substance use    Home Meds:  - Zoloft  - Adderall   - Seroquel    MEDICATIONS  (STANDING):  sodium chloride 0.9% Bolus 1000milliLiter(s) IV Bolus once    MEDICATIONS  (PRN):    Allergies    trazodone (Anaphylaxis)    Intolerances    VITAL SIGNS:  T(F): 98.6  HR: 126  BP: 143/97  RR: 20  SpO2: 100%  Wt(kg): --    PHYSICAL EXAM:    Constitutional: NAD, well-groomed, well-developed  Mouth: Dry mucous membrane  HEENT: Dilated pupils reactive to light, clear conjunctiva, EOMI,   Neck: No LAD, No JVD  Back: Normal spine flexure, No CVA tenderness  Respiratory: CTAB  Cardiovascular: S1 and S2, tachycardia  Gastrointestinal: BS+, soft, NT/ND  Extremities: No peripheral edema  Vascular: 2+ peripheral pulses  Neurological: A/O x 3, no tremors  Psychiatric: Mild paranoia  Musculoskeletal: 5/5 strength b/l upper and lower extremities  Skin: No rashes      LABS:                        14.8   10.9  )-----------( 514      ( 2017 03:19 )             43.5     2017 03:19    139    |  105    |  5      ----------------------------<  118    3.6     |  22     |  0.68     Ca    9.5        2017 03:19    TPro  8.7    /  Alb  4.6    /  TBili  0.4    /  DBili  x      /  AST  33     /  ALT  36     /  AlkPhos  84     2017 03:19    PT/INR - ( 2017 03:19 )   PT: 12.7 sec;   INR: 1.14          PTT - ( 2017 03:19 )  PTT:29.1 sec  Urinalysis Basic - ( 2017 04:01 )    Color: Yellow / Appearance: Clear / S.020 / pH: x  Gluc: x / Ketone: NEGATIVE  / Bili: NEGATIVE / Urobili: 0.2 E.U./dL   Blood: x / Protein: NEGATIVE mg/dL / Nitrite: NEGATIVE   Leuk Esterase: NEGATIVE / RBC: 5-10 /HPF / WBC < 5 /HPF   Sq Epi: x / Non Sq Epi: Rare /HPF / Bacteria: Present /HPF        RADIOLOGY & ADDITIONAL TESTS:    EXAM:  CT ANGIO CHEST PE PROTOCOL IC    IMPRESSION:   No evidence of pulmonary embolism. No consolidation.

## 2017-01-03 NOTE — ED PROVIDER NOTE - ENMT, MLM
Airway patent, Nasal mucosa clear. Lips dry.  Mouth with normal mucosa. Throat has no vesicles, no oropharyngeal exudates and uvula is midline.

## 2017-01-03 NOTE — PROGRESS NOTE ADULT - PROBLEM SELECTOR PLAN 1
Continue C.O. for safety.  Start Seroquel 50 mg po q 6 hours prn extreme paranoia.   Continue prn lorazepam as needed.  Collateral info.

## 2017-01-03 NOTE — CHART NOTE - NSCHARTNOTEFT_GEN_A_CORE
Patient's parents called during night shift, took down their call back number and asked patient if it is okay to speak with her parents, she agreed and give her parents' number which matched with the number given ( in [place]). As per parents, no known psychiatric history or prior episodes of drug overdose. Pt has history of HTN, polycystic ovarian syndrome (on OCP), thoracic outlet syndrome (managed by Dr. Clifton  and Dr. Gorman ). Pt is supposed to have a venogram soon. Takes Seroquel for insomnia, Atenolol for HTN. Took Ambien once 3 weeks ago and had hallucinations and was taken to the ED for that.     Mother also reports receiving a text from patient yesterday early morning stating "If my friends ask if I take insulin for diabetes, just say yes and go with it" and was not sure why she told her that.

## 2017-01-03 NOTE — ED PROVIDER NOTE - OBJECTIVE STATEMENT
Hx of upper extremity dvt due to thoracic outlet synd; s/p pectoral resection and cervical rib resection in Nov 2016 - was drinking some champagne (apx 2 glasses), smokinig marijuana, and tried what was supposedly ecstacy tonight - began to dvelop SOB.  Also with feeling of jaw "locking," tongue swelling and perioral paresthesias & tingling in fingers.  She started taking Zoloft about a week ago, and is also on Seroquel.

## 2017-01-03 NOTE — ED PROVIDER NOTE - PMH
DVT of upper extremity (deep vein thrombosis)    Thoracic outlet syndrome associated with cervical rib

## 2017-01-03 NOTE — H&P ADULT. - PROBLEM SELECTOR PLAN 1
UTox positive for amphetamines; patient reported that she took an unknown powder form of "ecstasy" - may have been crystal methamphetamine or another amphetamine.  - Psychiatry consulted; recommend 25mg Seroquel PO q6hr PRN extreme agitation  - follow up Psychiatry recs  - observe on telemetry UTox positive for amphetamines; patient reported that she took an unknown powder form of "ecstasy" - may have been crystal methamphetamine or another amphetamine.  - Psychiatry consulted; recommend 25mg Seroquel PO q6hr PRN extreme agitation  - 0.5mg Ativan IV q4hr PRN agitation  - patient on 25mg atenolol qd for "high heart rate". start 5mg Lopressor IV q6hr with hold parameters for SBP<100 and/or HR<60min.  - follow up Psychiatry recs  - observe on telemetry

## 2017-01-03 NOTE — ED ADULT TRIAGE NOTE - CHIEF COMPLAINT QUOTE
pt states "My tongue is swelling up. There's a bump on my tongue that just came about." According to EMS pt took some oral substance similar to esctasy, also drank alcohol and smoked marijuana. Pts bilateral pupils dilated. pt states "My tongue is swelling up. There's a bump on my tongue that just came about." According to EMS pt took some oral substance similar to esctasy, also drank alcohol and smoked marijuana. Pts bilateral pupils dilated. Airway is clear no wheezing or throat swelling noted

## 2017-01-03 NOTE — H&P ADULT. - HISTORY OF PRESENT ILLNESS
Ms. Pinon is a 26F with a medical history of thoracic outlet syndrome (s/p pectoral resection and cervical rib resection, 11/16), unspecified anxiety disorder, major depressive disorder (on Zoloft, reported Seroquel to another provider), DVT of the upper extremity, and recreational substance use who presented to [facility] for evaluation of shortness of breath and palpitations. According to the patient, she was at a house party (where she consumed alcohol and smoked marijuana) at which point she swallowed a white granular powder that was reported to be ecstasy. Several hours later, she was noted as being "really sweaty" by another person; the patient attempted to lie down, but noticed that she had difficulty breathing in addition to a sensation of her "jaw being locked". No chest pain. At this point, she came to the ED for further evaluation.    In the ED, her vitals were remarkable for tachycardia in 150-160s (sinus tachycardia on EKG). ICU was consulted for possible serotonin syndrome, given the patient takes sertraline as an outpatient. She received 2L NS and Ativan 1mg x2, which reportedly helped alleviate her symptom severity. Ms. Pinon is a 26F, visiting from [place], with a medical history of thoracic outlet syndrome (s/p pectoral resection and cervical rib resection, 11/16), unspecified anxiety disorder, major depressive disorder (on Zoloft, reported Seroquel to another provider), DVT of the upper extremity, and recreational substance use who presented to [facility] for evaluation of shortness of breath and palpitations. According to the patient, she was at a house party (where she consumed alcohol and smoked marijuana) at which point she swallowed a white granular powder that was reported to be ecstasy. Several hours later, she was noted as being "really sweaty" by another person; the patient attempted to lie down, but noticed that she had difficulty breathing in addition to a sensation of her "jaw being locked". No chest pain. At this point, she came to the ED for further evaluation.    In the ED, her vitals were remarkable for tachycardia in 150-160s (sinus tachycardia on EKG). ICU was consulted for possible serotonin syndrome, given the patient takes sertraline as an outpatient. She received 2L NS and Ativan 1mg x2, which reportedly helped alleviate her symptom severity.

## 2017-01-04 LAB
ANION GAP SERPL CALC-SCNC: 9 MMOL/L — SIGNIFICANT CHANGE UP (ref 9–16)
BASOPHILS NFR BLD AUTO: 0.2 % — SIGNIFICANT CHANGE UP (ref 0–2)
BUN SERPL-MCNC: 4 MG/DL — LOW (ref 7–23)
CALCIUM SERPL-MCNC: 7.9 MG/DL — LOW (ref 8.5–10.5)
CHLORIDE SERPL-SCNC: 109 MMOL/L — HIGH (ref 96–108)
CO2 SERPL-SCNC: 23 MMOL/L — SIGNIFICANT CHANGE UP (ref 22–31)
CREAT SERPL-MCNC: 0.68 MG/DL — SIGNIFICANT CHANGE UP (ref 0.5–1.3)
EOSINOPHIL NFR BLD AUTO: 2.1 % — SIGNIFICANT CHANGE UP (ref 0–6)
GLUCOSE SERPL-MCNC: 89 MG/DL — SIGNIFICANT CHANGE UP (ref 70–99)
HBA1C BLD-MCNC: 5.3 % — SIGNIFICANT CHANGE UP (ref 4.8–5.6)
HCT VFR BLD CALC: 38.5 % — SIGNIFICANT CHANGE UP (ref 34.5–45)
HGB BLD-MCNC: 12.6 G/DL — SIGNIFICANT CHANGE UP (ref 11.5–15.5)
LYMPHOCYTES # BLD AUTO: 35.2 % — SIGNIFICANT CHANGE UP (ref 13–44)
MAGNESIUM SERPL-MCNC: 1.8 MG/DL — SIGNIFICANT CHANGE UP (ref 1.6–2.4)
MCHC RBC-ENTMCNC: 29.4 PG — SIGNIFICANT CHANGE UP (ref 27–34)
MCHC RBC-ENTMCNC: 32.7 G/DL — SIGNIFICANT CHANGE UP (ref 32–36)
MCV RBC AUTO: 90 FL — SIGNIFICANT CHANGE UP (ref 80–100)
MONOCYTES NFR BLD AUTO: 10.1 % — SIGNIFICANT CHANGE UP (ref 2–14)
NEUTROPHILS NFR BLD AUTO: 52.4 % — SIGNIFICANT CHANGE UP (ref 43–77)
PHOSPHATE SERPL-MCNC: 2.5 MG/DL — SIGNIFICANT CHANGE UP (ref 2.5–4.5)
PLATELET # BLD AUTO: 268 K/UL — SIGNIFICANT CHANGE UP (ref 150–400)
POTASSIUM SERPL-MCNC: 3.7 MMOL/L — SIGNIFICANT CHANGE UP (ref 3.5–5.3)
POTASSIUM SERPL-SCNC: 3.7 MMOL/L — SIGNIFICANT CHANGE UP (ref 3.5–5.3)
RBC # BLD: 4.28 M/UL — SIGNIFICANT CHANGE UP (ref 3.8–5.2)
RBC # FLD: 13.5 % — SIGNIFICANT CHANGE UP (ref 10.3–16.9)
SODIUM SERPL-SCNC: 141 MMOL/L — SIGNIFICANT CHANGE UP (ref 135–145)
WBC # BLD: 8.4 K/UL — SIGNIFICANT CHANGE UP (ref 3.8–10.5)
WBC # FLD AUTO: 8.4 K/UL — SIGNIFICANT CHANGE UP (ref 3.8–10.5)

## 2017-01-04 PROCEDURE — 99233 SBSQ HOSP IP/OBS HIGH 50: CPT | Mod: GC

## 2017-01-04 PROCEDURE — 93010 ELECTROCARDIOGRAM REPORT: CPT

## 2017-01-04 PROCEDURE — 99233 SBSQ HOSP IP/OBS HIGH 50: CPT

## 2017-01-04 RX ORDER — POTASSIUM CHLORIDE 20 MEQ
40 PACKET (EA) ORAL ONCE
Qty: 0 | Refills: 0 | Status: COMPLETED | OUTPATIENT
Start: 2017-01-04 | End: 2017-01-04

## 2017-01-04 RX ORDER — ATENOLOL 25 MG/1
25 TABLET ORAL DAILY
Qty: 0 | Refills: 0 | Status: DISCONTINUED | OUTPATIENT
Start: 2017-01-04 | End: 2017-01-06

## 2017-01-04 RX ORDER — IBUPROFEN 200 MG
200 TABLET ORAL EVERY 6 HOURS
Qty: 0 | Refills: 0 | Status: DISCONTINUED | OUTPATIENT
Start: 2017-01-04 | End: 2017-01-06

## 2017-01-04 RX ORDER — SIMETHICONE 80 MG/1
80 TABLET, CHEWABLE ORAL DAILY
Qty: 0 | Refills: 0 | Status: DISCONTINUED | OUTPATIENT
Start: 2017-01-04 | End: 2017-01-06

## 2017-01-04 RX ADMIN — Medication 0.5 MILLIGRAM(S): at 13:54

## 2017-01-04 RX ADMIN — ATENOLOL 25 MILLIGRAM(S): 25 TABLET ORAL at 13:53

## 2017-01-04 RX ADMIN — HEPARIN SODIUM 5000 UNIT(S): 5000 INJECTION INTRAVENOUS; SUBCUTANEOUS at 13:53

## 2017-01-04 RX ADMIN — Medication 1 MILLIGRAM(S): at 00:48

## 2017-01-04 RX ADMIN — Medication 0.5 MILLIGRAM(S): at 22:02

## 2017-01-04 RX ADMIN — Medication 5 MILLIGRAM(S): at 04:27

## 2017-01-04 RX ADMIN — SIMETHICONE 80 MILLIGRAM(S): 80 TABLET, CHEWABLE ORAL at 13:53

## 2017-01-04 RX ADMIN — SODIUM CHLORIDE 120 MILLILITER(S): 9 INJECTION INTRAMUSCULAR; INTRAVENOUS; SUBCUTANEOUS at 06:07

## 2017-01-04 RX ADMIN — Medication 40 MILLIEQUIVALENT(S): at 07:28

## 2017-01-04 RX ADMIN — HEPARIN SODIUM 5000 UNIT(S): 5000 INJECTION INTRAVENOUS; SUBCUTANEOUS at 06:07

## 2017-01-04 RX ADMIN — Medication 5 MILLIGRAM(S): at 10:38

## 2017-01-04 RX ADMIN — HEPARIN SODIUM 5000 UNIT(S): 5000 INJECTION INTRAVENOUS; SUBCUTANEOUS at 22:02

## 2017-01-04 NOTE — PROGRESS NOTE ADULT - ASSESSMENT
26F with a medical history of thoracic outlet syndrome (s/p pectoral resection and cervical rib resection, 11/16), unspecified anxiety disorder, major depressive disorder (on Zoloft, reported Seroquel to another provider), DVT of the upper extremity, and recreational substance use who presented to [facility] for evaluation of shortness of breath and palpitations, likely secondary to overdose with hallucinogenic amphetamine derivatives.

## 2017-01-04 NOTE — PROGRESS NOTE ADULT - SUBJECTIVE AND OBJECTIVE BOX
Pt seen earlier this afternoon; discussed with team at that time and again later today.    Pt observed resting in bed, DWAYNE.O. aide in attendance. She endorses feeling better overall, is aware that she was hallucinating yesterday and that her perceptions and paranoid thoughts were not reality-based.    Pt eager to discuss the details of her now-resolved paranoid thoughts and hallucinations. She states she was convinced that her friends were trying to undermine her, talking about her in the hallway (when they were not actually present), also highjacking/hacking her computer.    Pt provided a bit more hx: She has been on Zoloft rx-ed by her PMD x 3-4 weeks. When she didn't feel the initial dose of 50 mg was benefitting her, she self-increased the dose to 75 mg to 100 mg po qdaily.  Pt reports she was increasingly tearful on the 50 mg and 75 mg doses, but seemed to be benefitting from the higher dose. Pt seen earlier this afternoon; discussed with team at that time and again later today.    Pt observed resting in bed, C.O. aide in attendance. She endorses feeling better overall, is aware that she was hallucinating yesterday and that her perceptions and paranoid thoughts were not reality-based. Per medical team, pt is d/c ready.    Pt eager to discuss the details of her now-resolved paranoid thoughts and hallucinations. She states that while psychotic, she was convinced that her friends were trying to undermine her, talking about her in the hallway (when they were not actually present), also highjacking/hacking her computer. Pt perturbed by what happened, especially since she wrote texts and made calls to her friends which she now regrets.    Pt provided a bit more hx: She has been on Zoloft 50 mg po qdaily x 3-4 weeks and seroquel 50 mg po qhs, both rx-ed by her PMD. When she didn't feel the initial dose of 50 mg Zoloft was benefitting her, she self-increased the dose to 75 mg to 100 mg po qdaily.  Pt reports she was increasingly tearful on the 50 mg and 75 mg doses, but seemed to be benefitting from the higher dose.     Pt added that she urinated in her bed after a night of drinking on New Year's Marzena.    Pt voluntarily stated that she knows she needs to see an outpt psychiatrist when she returns to [place].    Pt offered a voluntary admission to inpt psychiatrist, 8 U, but she declined.     Case discussed later with Dr. Aviles, who obtained additional collateral info from pt's parents and friends: Pt has been surreptitiously using insulin, claiming to have Type I DM, for unclear reasons. Pt apparently has insulin and SQ needles in her belongings. Due to pt's behavior while psychotic, her friend informed her that she would not be picking up pt or bringing her to her home. Per Dr. Aviles, pt was dysphoric with the news.      MEDICATIONS  (STANDING):  sodium chloride 0.9%. 1000milliLiter(s) IV Continuous <Continuous>  heparin  Injectable 5000Unit(s) SubCutaneous every 8 hours  ATENolol  Tablet 25milliGRAM(s) Oral daily  simethicone 80milliGRAM(s) Chew daily    MEDICATIONS  (PRN):  LORazepam   Injectable 0.5milliGRAM(s) IV Push every 4 hours PRN Agitation  QUEtiapine 50milliGRAM(s) Oral every 6 hours PRN paranoia  ibuprofen  Tablet 200milliGRAM(s) Oral every 6 hours PRN cramps    Vital Signs Last 24 Hrs  T(C): 36.7, Max: 37.2 (01-04 @ 09:12)  T(F): 98.1, Max: 99 (01-04 @ 09:12)  HR: 118 (104 - 118)  BP: 130/85 (126/88 - 147/96)  BP(mean): 103 (103 - 117)  RR: 16 (16 - 20)  SpO2: 96% (96% - 98%)                          12.6   8.4   )-----------( 268      ( 04 Jan 2017 06:29 )             38.5                           12.6   8.4   )-----------( 268      ( 04 Jan 2017 06:29 )             38.5   04 Jan 2017 06:27    141    |  109    |  4      ----------------------------<  89     3.7     |  23     |  0.68     Ca    7.9        04 Jan 2017 06:27  Phos  2.5       04 Jan 2017 06:27  Mg     1.8       04 Jan 2017 06:27    TPro  7.5    /  Alb  4.1    /  TBili  1.0    /  DBili  x      /  AST  21     /  ALT  29     /  AlkPhos  82     03 Jan 2017 18:23    ROS: Psych: As per HPI. All other systems negative.    MSE earlier today: In bed, calmer, better related, recalls meeting me. No psychomotor, speech or EC abn. Mood "Better." Affect: Mood congruent, slightly anxious, appropriate. TP: Logical, GD. TC: No SI, HI, AH, VH, PI. A+O x 4. Insight: Fair. Judgment Fair.

## 2017-01-04 NOTE — PROGRESS NOTE ADULT - ASSESSMENT
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 Pt denies suicidal ideation/plan/intent. Pt states she intends to follow up with an outpt psychiatrist when she returns to [place]. Pt appeared to understand that her psychosis was a result of her illicit drug use, especially in combination with SSRI, which probably increased effects of the drugs and alcohol she ingested.

## 2017-01-04 NOTE — PROGRESS NOTE ADULT - PROBLEM SELECTOR PLAN 1
Continue C.O. until pt is discharged, to ensure safety, although pt does not meet criteria for involuntary inpt psych admission and decline voluntary admission.  Pt safe for D/C once accompanied by family and/or friends. Pt reports she plans to return home to [place] on Friday.  Given that pt has insulin and SQ needles in her possession, these should be removed and sent to the pharmacy. Pt's belongings should be examined by Security to ensure that no other medications are available for her use, whether prescribed or not.  Continue Seroquel, both for resolving psychosis, and because pt takes this medication as an outpt. Continue C.O. until pt is discharged, to ensure safety, although pt does not meet criteria for involuntary inpt psych admission and declines voluntary admission.  Pt safe for D/C once accompanied by family and/or friends. Pt reports she plans to return home to [place] on Friday.  Given that pt has insulin and SQ needles in her possession, these should be removed and sent to the pharmacy. Pt's belongings should be examined by Security to ensure that no other medications are available for her use, whether prescribed or not.  Continue Seroquel, both for resolving psychosis, and because pt takes this medication as an outpt.

## 2017-01-04 NOTE — PROGRESS NOTE ADULT - SUBJECTIVE AND OBJECTIVE BOX
INTERVAL HPI/OVERNIGHT EVENTS:  Patient reports feeling "better than yesterday". At present, the patient denies any shortness of breath, cough, fevers, chills, chest pain, N/V/D, and/or urinary symptoms. She states that she still "hears people outside coming for her" and reports great concern that her friends have accessed her computer and are reviewing messages/sending messages on her behalf.    VITAL SIGNS:  T(F): 98.8  HR: 110  BP: 126/88  RR: 18  SpO2: 98%  Wt(kg): --    PHYSICAL EXAM:  GENERAL: Well-developed, slightly disheveled female in no apparent distress.  HEENT: Head is normocephalic and atraumatic, with extraocular muscle movements intact bilaterally. Pupils are dilated and minimally reactive to light and accommodation. There is vertical, rotary nystagmus on prolonged upward gaze. Oral mucosa moist and without lesions. Neck is supple, without appreciable lymphadenopathy or thyromegaly.  PULMONARY/THORAX: Diminished breath sounds at the bilateral lung bases.  CARDIOVASCULAR: Tachycardic with a regular rhythm, no murmurs, rubs, or gallops.  ABDOMEN: Soft, nontender, and nondistended.  Normoactive bowel sounds.  No hepatosplenomegaly was noted.  EXTREMITIES: Without cyanosis, clubbing, overt lesions, or edema.  NEUROLOGIC: A&Ox3; CNs II-XII are grossly intact.  DERMATOLOGIC: No cutaneous lesions noted on exposed skin.    MEDICATIONS  (STANDING):  sodium chloride 0.9%. 1000milliLiter(s) IV Continuous <Continuous>  heparin  Injectable 5000Unit(s) SubCutaneous every 8 hours  metoprolol Injectable 5milliGRAM(s) IV Push every 6 hours  potassium chloride    Tablet ER 40milliEquivalent(s) Oral once    MEDICATIONS  (PRN):  LORazepam   Injectable 0.5milliGRAM(s) IV Push every 4 hours PRN Agitation  QUEtiapine 50milliGRAM(s) Oral every 6 hours PRN paranoia      Allergies    trazodone (Anaphylaxis)    Intolerances        LABS:                        12.6   8.4   )-----------( 268      ( 2017 06:29 )             38.5     2017 06:27    141    |  109    |  4      ----------------------------<  89     3.7     |  23     |  0.68     Ca    7.9        2017 06:27  Phos  2.5       2017 06:27  Mg     1.8       2017 06:27    TPro  7.5    /  Alb  4.1    /  TBili  1.0    /  DBili  x      /  AST  21     /  ALT  29     /  AlkPhos  82     2017 18:23    PT/INR - ( 2017 03:19 )   PT: 12.7 sec;   INR: 1.14          PTT - ( 2017 03:19 )  PTT:29.1 sec  Urinalysis Basic - ( 2017 04:01 )    Color: Yellow / Appearance: Clear / S.020 / pH: x  Gluc: x / Ketone: NEGATIVE  / Bili: NEGATIVE / Urobili: 0.2 E.U./dL   Blood: x / Protein: NEGATIVE mg/dL / Nitrite: NEGATIVE   Leuk Esterase: NEGATIVE / RBC: 5-10 /HPF / WBC < 5 /HPF   Sq Epi: x / Non Sq Epi: Rare /HPF / Bacteria: Present /HPF HOSPITAL COURSE  Ms. Pinon is a 26F, visiting from [place], with a medical history of thoracic outlet syndrome (s/p pectoral resection and cervical rib resection, ), unspecified anxiety disorder, major depressive disorder (on Zoloft as outpatient), DVT of the upper extremity, and polysubstance use who presented to [facility] for evaluation of likely substance-induced psychosis, likely secondary to hallucinogenic amphetamines. According to the patient, she was at a house party (where she consumed alcohol and smoked marijuana) where she consumed a white granular powder that was reported to be ecstasy. Several hours later, she was noted as being "really sweaty" by another person; the patient attempted to lie down, but noticed that she had difficulty breathing in addition to a sensation of her "jaw being locked". She came to the ED for evaluation, where she was found to have tachycardia into the 150s as well as extreme paranoia, hyperalertness, and auditory hallucinations. Admitted to ICU stepdown for observation. While on 7Lach, she remained persistently tachycardic and agitated ("people are out to get me", "there are people hiding underneath my bed"    INTERVAL HPI/OVERNIGHT EVENTS:  Patient reports feeling "better than yesterday". At present, the patient denies any shortness of breath, cough, fevers, chills, chest pain, N/V/D, and/or urinary symptoms. She states that she still "hears people outside coming for her" and reports great concern that her friends have accessed her computer and are reviewing messages/sending messages on her behalf.    VITAL SIGNS:  T(F): 98.8  HR: 110  BP: 126/88  RR: 18  SpO2: 98%  Wt(kg): --    PHYSICAL EXAM:  GENERAL: Well-developed, slightly disheveled female in no apparent distress.  HEENT: Head is normocephalic and atraumatic, with extraocular muscle movements intact bilaterally. Pupils are dilated and minimally reactive to light and accommodation. There is vertical, rotary nystagmus on prolonged upward gaze. Oral mucosa moist and without lesions. Neck is supple, without appreciable lymphadenopathy or thyromegaly.  PULMONARY/THORAX: Diminished breath sounds at the bilateral lung bases.  CARDIOVASCULAR: Tachycardic with a regular rhythm, no murmurs, rubs, or gallops.  ABDOMEN: Soft, nontender, and nondistended.  Normoactive bowel sounds.  No hepatosplenomegaly was noted.  EXTREMITIES: Without cyanosis, clubbing, overt lesions, or edema.  NEUROLOGIC: A&Ox3; CNs II-XII are grossly intact.  DERMATOLOGIC: No cutaneous lesions noted on exposed skin.    MEDICATIONS  (STANDING):  sodium chloride 0.9%. 1000milliLiter(s) IV Continuous <Continuous>  heparin  Injectable 5000Unit(s) SubCutaneous every 8 hours  metoprolol Injectable 5milliGRAM(s) IV Push every 6 hours  potassium chloride    Tablet ER 40milliEquivalent(s) Oral once    MEDICATIONS  (PRN):  LORazepam   Injectable 0.5milliGRAM(s) IV Push every 4 hours PRN Agitation  QUEtiapine 50milliGRAM(s) Oral every 6 hours PRN paranoia      Allergies    trazodone (Anaphylaxis)    Intolerances        LABS:                        12.6   8.4   )-----------( 268      ( 2017 06:29 )             38.5     2017 06:27    141    |  109    |  4      ----------------------------<  89     3.7     |  23     |  0.68     Ca    7.9        2017 06:27  Phos  2.5       2017 06:27  Mg     1.8       2017 06:27    TPro  7.5    /  Alb  4.1    /  TBili  1.0    /  DBili  x      /  AST  21     /  ALT  29     /  AlkPhos  82     2017 18:23    PT/INR - ( 2017 03:19 )   PT: 12.7 sec;   INR: 1.14          PTT - ( 2017 03:19 )  PTT:29.1 sec  Urinalysis Basic - ( 2017 04:01 )    Color: Yellow / Appearance: Clear / S.020 / pH: x  Gluc: x / Ketone: NEGATIVE  / Bili: NEGATIVE / Urobili: 0.2 E.U./dL   Blood: x / Protein: NEGATIVE mg/dL / Nitrite: NEGATIVE   Leuk Esterase: NEGATIVE / RBC: 5-10 /HPF / WBC < 5 /HPF   Sq Epi: x / Non Sq Epi: Rare /HPF / Bacteria: Present /HPF

## 2017-01-04 NOTE — PROGRESS NOTE ADULT - PROBLEM SELECTOR PLAN 1
UTox positive for amphetamines; patient reported that she took an unknown powder form of "ecstasy" - may have been crystal methamphetamine or another hallucinogenic amphetamine. Rotatory nystagmus on exam today.  - Psychiatry saw patient; recommend increasing to 50mg Seroquel PO q6hr PRN extreme agitation. Keep 0.5mg Ativan IV q4hr PRN agitation. Patient may benefit from inpatient psychiatric care once she is medically stabilized.  - transition 5mg Lopressor IV q6hr (with hold parameters for SBP<100 and/or HR<60min) to oral beta blocker

## 2017-01-04 NOTE — PROGRESS NOTE ADULT - PROBLEM SELECTOR PLAN 5
regular diet  replete electrolytes as directed by daily laboratory results    DISPO: pending clinical improvement  FULL CODE

## 2017-01-04 NOTE — PROGRESS NOTE ADULT - SUBJECTIVE AND OBJECTIVE BOX
Pt seen; chart reviewed; discussed with team.    Full note to follow.    For now: Pt is safe for D/C accompanied by friends and/or family.

## 2017-01-04 NOTE — CHART NOTE - NSCHARTNOTEFT_GEN_A_CORE
Patient seen at bedside by medical team, informed that her friend had brought her belongings to the hospital and requested that the patient no longer stay with them. Patient was extremely tearful. Dr. Newsome from Psychiatry was called, made aware of updates.    PLAN:  1) Safe discharge needs further planning. To stay in hospital overnight.  2) One-to-one observation continued. Dr. Newsome aware, will extend order.  3) 0.5mg Ativan IV q4hr PRN anxiety/agitation Patient seen at bedside by medical team, informed that her friend had brought her belongings to the hospital and requested that the patient no longer stay with them. Patient was extremely tearful. Dr. Newsome from Psychiatry was called, made aware of updates.    Her friend visited and provided a few updates to her case. According to the friend, the patient has been taking since high school. She was reportedly diagnosed with T2DM while taking prednisone as a teenager; this resolved after discontinuing glucocorticoids. Review of her A1c from this morning was in the 5s, and her serum glucose levels have been normal since admission. The patient was also reported as having insulin at the bedside. MD spoke about it with the patient; she revealed that he has been factitiously taking insulin intermittently for several years, often presenting herself as a diabetic. When asked about drive for this behavior, she said she "didn't know". Patient willingly gave MD her insulin to send to the pharmacy for verification, as well as storage away from the bedside.    PLAN:  1) Safe discharge needs further planning. To stay in hospital overnight.  2) One-to-one observation continued. Dr. Newsome aware, will extend order.  3) Insulin removed from bedside.  4) 0.5mg Ativan IV q4hr PRN anxiety/agitation

## 2017-01-05 DIAGNOSIS — F68.10 FACTITIOUS DISORDER IMPOSED ON SELF, UNSPECIFIED: ICD-10-CM

## 2017-01-05 LAB
ANION GAP SERPL CALC-SCNC: 13 MMOL/L — SIGNIFICANT CHANGE UP (ref 9–16)
BASOPHILS NFR BLD AUTO: 0.4 % — SIGNIFICANT CHANGE UP (ref 0–2)
BUN SERPL-MCNC: 5 MG/DL — LOW (ref 7–23)
CALCIUM SERPL-MCNC: 8.8 MG/DL — SIGNIFICANT CHANGE UP (ref 8.5–10.5)
CHLORIDE SERPL-SCNC: 105 MMOL/L — SIGNIFICANT CHANGE UP (ref 96–108)
CO2 SERPL-SCNC: 21 MMOL/L — LOW (ref 22–31)
CREAT SERPL-MCNC: 0.6 MG/DL — SIGNIFICANT CHANGE UP (ref 0.5–1.3)
EOSINOPHIL NFR BLD AUTO: 2.4 % — SIGNIFICANT CHANGE UP (ref 0–6)
GLUCOSE SERPL-MCNC: 81 MG/DL — SIGNIFICANT CHANGE UP (ref 70–99)
HCT VFR BLD CALC: 38 % — SIGNIFICANT CHANGE UP (ref 34.5–45)
HGB BLD-MCNC: 12.4 G/DL — SIGNIFICANT CHANGE UP (ref 11.5–15.5)
LYMPHOCYTES # BLD AUTO: 36.5 % — SIGNIFICANT CHANGE UP (ref 13–44)
MAGNESIUM SERPL-MCNC: 2 MG/DL — SIGNIFICANT CHANGE UP (ref 1.6–2.4)
MCHC RBC-ENTMCNC: 29 PG — SIGNIFICANT CHANGE UP (ref 27–34)
MCHC RBC-ENTMCNC: 32.6 G/DL — SIGNIFICANT CHANGE UP (ref 32–36)
MCV RBC AUTO: 89 FL — SIGNIFICANT CHANGE UP (ref 80–100)
MONOCYTES NFR BLD AUTO: 10.5 % — SIGNIFICANT CHANGE UP (ref 2–14)
NEUTROPHILS NFR BLD AUTO: 50.2 % — SIGNIFICANT CHANGE UP (ref 43–77)
PHOSPHATE SERPL-MCNC: 3.5 MG/DL — SIGNIFICANT CHANGE UP (ref 2.5–4.5)
PLATELET # BLD AUTO: 237 K/UL — SIGNIFICANT CHANGE UP (ref 150–400)
POTASSIUM SERPL-MCNC: 3.4 MMOL/L — LOW (ref 3.5–5.3)
POTASSIUM SERPL-SCNC: 3.4 MMOL/L — LOW (ref 3.5–5.3)
RBC # BLD: 4.27 M/UL — SIGNIFICANT CHANGE UP (ref 3.8–5.2)
RBC # FLD: 13.4 % — SIGNIFICANT CHANGE UP (ref 10.3–16.9)
SODIUM SERPL-SCNC: 139 MMOL/L — SIGNIFICANT CHANGE UP (ref 135–145)
WBC # BLD: 7.2 K/UL — SIGNIFICANT CHANGE UP (ref 3.8–10.5)
WBC # FLD AUTO: 7.2 K/UL — SIGNIFICANT CHANGE UP (ref 3.8–10.5)

## 2017-01-05 PROCEDURE — 99232 SBSQ HOSP IP/OBS MODERATE 35: CPT | Mod: GC

## 2017-01-05 RX ORDER — POTASSIUM CHLORIDE 20 MEQ
40 PACKET (EA) ORAL ONCE
Qty: 0 | Refills: 0 | Status: DISCONTINUED | OUTPATIENT
Start: 2017-01-05 | End: 2017-01-06

## 2017-01-05 RX ORDER — SERTRALINE 25 MG/1
50 TABLET, FILM COATED ORAL DAILY
Qty: 0 | Refills: 0 | Status: DISCONTINUED | OUTPATIENT
Start: 2017-01-05 | End: 2017-01-06

## 2017-01-05 RX ADMIN — SERTRALINE 50 MILLIGRAM(S): 25 TABLET, FILM COATED ORAL at 11:52

## 2017-01-05 RX ADMIN — SIMETHICONE 80 MILLIGRAM(S): 80 TABLET, CHEWABLE ORAL at 11:52

## 2017-01-05 RX ADMIN — HEPARIN SODIUM 5000 UNIT(S): 5000 INJECTION INTRAVENOUS; SUBCUTANEOUS at 22:56

## 2017-01-05 RX ADMIN — HEPARIN SODIUM 5000 UNIT(S): 5000 INJECTION INTRAVENOUS; SUBCUTANEOUS at 05:17

## 2017-01-05 NOTE — DISCHARGE NOTE ADULT - PLAN OF CARE
Follow up care on an outpatient basis You were admitted to the hospital with symptoms related to overdose on an unknown substance. While here, we have medically stabilized you for discharge from the hospital. Moving forward, we believe that counseling and/or seeing a mental health professional would be very beneficial. If you have any issues accessing these services once back in [place], please contact your primary care provider: there are a number of options that may work alongside your insurance (or do not require payment at all) to get you the care that may best help you. Please follow up with your cardiothoracic surgeon in [place]. You have a history of sinus tachycardia, meaning your heart beats quickly. Please resume your home dose of atenolol as prescribed; follow up with your primary care provider within the next two weeks.

## 2017-01-05 NOTE — PROGRESS NOTE ADULT - PROBLEM SELECTOR PROBLEM 1
Amphetamine overdose, accidental or unintentional, initial encounter
Amphetamine overdose, accidental or unintentional, initial encounter
Substance-induced psychotic disorder with delusions
Substance-induced psychotic disorder with delusions

## 2017-01-05 NOTE — DISCHARGE NOTE ADULT - PATIENT PORTAL LINK FT
“You can access the FollowHealth Patient Portal, offered by [facility], by registering with the following website: [email or web address]”

## 2017-01-05 NOTE — PROGRESS NOTE ADULT - PROBLEM SELECTOR PLAN 2
Reported operations in past two months to relieve obstruction. Communication with her CT surgeon in [place] reports nothing to do at present.
Reported operations in past two months to relieve obstruction. Communication with her CT surgeon in [place] reports nothing to do at present.
May re-start Zoloft 50 mg po qdaily, with likely plan for rx to be increased as outpt.
Hold Zoloft for now.

## 2017-01-05 NOTE — DISCHARGE NOTE ADULT - CARE PLAN
Principal Discharge DX:	Amphetamine overdose, accidental or unintentional, initial encounter  Goal:	Follow up care on an outpatient basis  Instructions for follow-up, activity and diet:	You were admitted to the hospital with symptoms related to overdose on an unknown substance. While here, we have medically stabilized you for discharge from the hospital. Moving forward, we believe that counseling and/or seeing a mental health professional would be very beneficial. If you have any issues accessing these services once back in [place], please contact your primary care provider: there are a number of options that may work alongside your insurance (or do not require payment at all) to get you the care that may best help you.  Secondary Diagnosis:	Thoracic outlet syndrome associated with cervical rib  Instructions for follow-up, activity and diet:	Please follow up with your cardiothoracic surgeon in [place].  Secondary Diagnosis:	Sinus tachycardia  Instructions for follow-up, activity and diet:	You have a history of sinus tachycardia, meaning your heart beats quickly. Please resume your home dose of atenolol as prescribed; follow up with your primary care provider within the next two weeks. Principal Discharge DX:	Amphetamine overdose, accidental or unintentional, initial encounter  Goal:	Follow up care on an outpatient basis  Instructions for follow-up, activity and diet:	You were admitted to the hospital with symptoms related to overdose on an unknown substance. While here, we have medically stabilized you for discharge from the hospital. Moving forward, we believe that counseling and/or seeing a mental health professional would be very beneficial. If you have any issues accessing these services once back in [place], please contact your primary care provider: there are a number of options that may work alongside your insurance (or do not require payment at all) to get you the care that may best help you.  Secondary Diagnosis:	Thoracic outlet syndrome associated with cervical rib  Instructions for follow-up, activity and diet:	Please follow up with your cardiothoracic surgeon in [place].  Secondary Diagnosis:	Sinus tachycardia  Instructions for follow-up, activity and diet:	You have a history of sinus tachycardia, meaning your heart beats quickly. Please resume your home dose of atenolol as prescribed; follow up with your primary care provider within the next two weeks. Principal Discharge DX:	Amphetamine overdose, accidental or unintentional, initial encounter  Goal:	Follow up care on an outpatient basis  Instructions for follow-up, activity and diet:	You were admitted to the hospital with symptoms related to overdose on an unknown substance. While here, we have medically stabilized you for discharge from the hospital. Moving forward, we believe that counseling and/or seeing a mental health professional would be very beneficial. If you have any issues accessing these services once back in [place], please contact your primary care provider: there are a number of options that may work alongside your insurance (or do not require payment at all) to get you the care that may best help you.  Secondary Diagnosis:	Thoracic outlet syndrome associated with cervical rib  Instructions for follow-up, activity and diet:	Please follow up with your cardiothoracic surgeon in [place].  Secondary Diagnosis:	Sinus tachycardia  Instructions for follow-up, activity and diet:	You have a history of sinus tachycardia, meaning your heart beats quickly. Please resume your home dose of atenolol as prescribed; follow up with your primary care provider within the next two weeks. Principal Discharge DX:	Amphetamine overdose, accidental or unintentional, initial encounter  Goal:	Follow up care on an outpatient basis  Instructions for follow-up, activity and diet:	You were admitted to the hospital with symptoms related to overdose on an unknown substance. While here, we have medically stabilized you for discharge from the hospital. Moving forward, we believe that counseling and/or seeing a mental health professional would be very beneficial. If you have any issues accessing these services once back in [place], please contact your primary care provider: there are a number of options that may work alongside your insurance (or do not require payment at all) to get you the care that may best help you.  Secondary Diagnosis:	Thoracic outlet syndrome associated with cervical rib  Instructions for follow-up, activity and diet:	Please follow up with your cardiothoracic surgeon in [place].  Secondary Diagnosis:	Sinus tachycardia  Instructions for follow-up, activity and diet:	You have a history of sinus tachycardia, meaning your heart beats quickly. Please resume your home dose of atenolol as prescribed; follow up with your primary care provider within the next two weeks. Principal Discharge DX:	Amphetamine overdose, accidental or unintentional, initial encounter  Goal:	Follow up care on an outpatient basis  Instructions for follow-up, activity and diet:	You were admitted to the hospital with symptoms related to overdose on an unknown substance. While here, we have medically stabilized you for discharge from the hospital. Moving forward, we believe that counseling and/or seeing a mental health professional would be very beneficial. If you have any issues accessing these services once back in [place], please contact your primary care provider: there are a number of options that may work alongside your insurance (or do not require payment at all) to get you the care that may best help you.  Secondary Diagnosis:	Thoracic outlet syndrome associated with cervical rib  Instructions for follow-up, activity and diet:	Please follow up with your cardiothoracic surgeon in [place].  Secondary Diagnosis:	Sinus tachycardia  Instructions for follow-up, activity and diet:	You have a history of sinus tachycardia, meaning your heart beats quickly. Please resume your home dose of atenolol as prescribed; follow up with your primary care provider within the next two weeks.

## 2017-01-05 NOTE — DISCHARGE NOTE ADULT - PROVIDER TOKENS
FREE:[LAST:[Ani],FIRST:[Will],PHONE:[(   )    -],FAX:[(   )    -],ADDRESS:[[address] [place] [id number]  [phone]]]

## 2017-01-05 NOTE — PROGRESS NOTE ADULT - PROBLEM SELECTOR PLAN 6
regular diet  replete electrolytes as directed by daily laboratory results    DISPO: stable for stepdown to [facility]  FULL CODE

## 2017-01-05 NOTE — PROGRESS NOTE ADULT - SUBJECTIVE AND OBJECTIVE BOX
Pt seen; chart reviewed and discussed with team.    Full note to follow. Pt seen; chart reviewed and discussed with team.    Full note to follow.    For now:    Pt with no active suicidal ideation/intent or plan.    Pt with no current psychosis.    Pt safe for D/C tomorrow, will take taxi directly to airport, fly home to [place], where she will be picked up by family.

## 2017-01-05 NOTE — PROGRESS NOTE ADULT - ATTENDING COMMENTS
Pt clinically improving with decreased heartrate bp and back on beta blocker. No suicidal ideations. Pts mom spoken to and med hx clarified no hx diabetes. Psych to f/u regarding tx to psych unit. Poison control f/u noted.
Patient remains stable hemodynamically and without hallucinations. no suicidal or homocidal ideations. [facility] to reassess for fischarge planning. Pts father speaking with her regarding hotel accomodations and taxi fare to airport. Pt. can be discharged when clear by psych and when she has a place to go until her flight back home.
If pt has factitious disorder, the treatment for this is consistent outpt care with ongoing visits to the same clinician(s).
Hopefully, pt's current presentation will resolve. If pt with protracted paranoia/psychosis, might benefit from inpt psych admit once medically stable.

## 2017-01-05 NOTE — PROGRESS NOTE ADULT - PROBLEM SELECTOR PLAN 1
UTox positive for amphetamines; patient reported that she took an unknown powder form of "ecstasy" - may have been crystal methamphetamine or another hallucinogenic amphetamine. Rotatory nystagmus on exam yesterday; has since resolved.  - Follow up Psychiatry recs; if OK with Dr. Newsome, LEEANNA Seroquel PRN and taper down Ativan PRN   - continue one-to-one until cleared by Dr. Newsome  - discuss safe discharge plan with patient/Dr. Newsome

## 2017-01-05 NOTE — DISCHARGE NOTE ADULT - HOSPITAL COURSE
Ms. Pinon is a 26F, visiting from [place], with a medical history of thoracic outlet syndrome (s/p pectoral resection and cervical rib resection, 11/16), unspecified anxiety disorder, major depressive disorder (on Zoloft as outpatient), DVT of the upper extremity, polysubstance use, and factitious insulin use who presented to [facility] for evaluation of likely substance-induced psychosis, likely secondary to hallucinogenic amphetamines. According to the patient, she was at a house party where she drank alcohol, smoked marijuana, and consumed a white granular powder that was reported to be ecstasy. Several hours later, she noted being "really sweaty"; when she attempted to lie down, she had difficulty breathing in addition to a sensation of her "jaw being locked". She came to the ED for evaluation, where she was found to have tachycardia into the 150s as well as extreme paranoia, hyperalertness, and auditory hallucinations. Admitted to ICU stepdown for observation. While on 7Lach, she remained persistently tachycardic and agitated ("people are out to get me", "there are people hiding underneath my bed", "they're paying you off to keep me here"). Dr. Newsome from Psychiatry was consulted; started Seroquel and Ativan PRN for agitation. Auditory hallucinations faded after 24-36hrs. Hospital course since complicated by social issues: family that patient was staying with has refused to let her return home with them. Furthermore, the patient revealed that he has been factitiously taking insulin intermittently for several years, often presenting herself as a diabetic. When asked about drive for this behavior, she said she "didn't know". Patient willingly gave MD her insulin to send to the pharmacy for verification, as well as storage away from the bedside. Patient made plan to leave for airport straight from hospital; Dr. Newsome from Psychiatry aware.

## 2017-01-05 NOTE — DISCHARGE NOTE ADULT - MEDICATION SUMMARY - MEDICATIONS TO TAKE
I will START or STAY ON the medications listed below when I get home from the hospital:    Zoloft  --  by mouth   -- Indication: For History of depression    SEROquel  --  by mouth   -- Indication: For History of depression    atenolol  --  by mouth   -- Indication: For Fast heart rate

## 2017-01-05 NOTE — DISCHARGE NOTE ADULT - NS MD DC FALL RISK RISK
For information on Fall & Injury Prevention, visit [email or web address]
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
## 2017-01-05 NOTE — PROGRESS NOTE ADULT - PROBLEM SELECTOR PROBLEM 2
Thoracic outlet syndrome associated with cervical rib
Thoracic outlet syndrome associated with cervical rib
History of depression
History of depression

## 2017-01-05 NOTE — PROGRESS NOTE ADULT - PROBLEM SELECTOR PLAN 4
According to the patient's friend, the patient has been taking since high school. She was reportedly diagnosed with insulin resistance while taking prednisone as a teenager; this resolved after discontinuing glucocorticoids. Review of her A1c from this admission was in the 5s, and her serum glucose levels have been normal since admission. The patient was also reported as having insulin at the bedside. MD spoke about it with the patient; she revealed that he has been factitiously taking insulin intermittently for several years, often presenting herself as a diabetic. When asked about drive for this behavior, she said she "didn't know". Patient willingly gave MD her insulin to send to the pharmacy for verification, as well as storage away from the bedside.  - Patient counseled on dangers associated with insulin injection; Dr. Newsome will discuss with her this afternoon.

## 2017-01-06 VITALS
DIASTOLIC BLOOD PRESSURE: 64 MMHG | OXYGEN SATURATION: 99 % | RESPIRATION RATE: 16 BRPM | HEART RATE: 87 BPM | SYSTOLIC BLOOD PRESSURE: 107 MMHG

## 2017-01-06 PROCEDURE — 84436 ASSAY OF TOTAL THYROXINE: CPT

## 2017-01-06 PROCEDURE — 81001 URINALYSIS AUTO W/SCOPE: CPT

## 2017-01-06 PROCEDURE — 85730 THROMBOPLASTIN TIME PARTIAL: CPT

## 2017-01-06 PROCEDURE — 96375 TX/PRO/DX INJ NEW DRUG ADDON: CPT | Mod: XU

## 2017-01-06 PROCEDURE — 71045 X-RAY EXAM CHEST 1 VIEW: CPT

## 2017-01-06 PROCEDURE — 80053 COMPREHEN METABOLIC PANEL: CPT

## 2017-01-06 PROCEDURE — 83735 ASSAY OF MAGNESIUM: CPT

## 2017-01-06 PROCEDURE — 36415 COLL VENOUS BLD VENIPUNCTURE: CPT

## 2017-01-06 PROCEDURE — 84100 ASSAY OF PHOSPHORUS: CPT

## 2017-01-06 PROCEDURE — 93005 ELECTROCARDIOGRAM TRACING: CPT | Mod: 76

## 2017-01-06 PROCEDURE — 96376 TX/PRO/DX INJ SAME DRUG ADON: CPT | Mod: XU

## 2017-01-06 PROCEDURE — 83605 ASSAY OF LACTIC ACID: CPT

## 2017-01-06 PROCEDURE — 96374 THER/PROPH/DIAG INJ IV PUSH: CPT | Mod: XU

## 2017-01-06 PROCEDURE — 83036 HEMOGLOBIN GLYCOSYLATED A1C: CPT

## 2017-01-06 PROCEDURE — 80048 BASIC METABOLIC PNL TOTAL CA: CPT

## 2017-01-06 PROCEDURE — 85025 COMPLETE CBC W/AUTO DIFF WBC: CPT

## 2017-01-06 PROCEDURE — 71275 CT ANGIOGRAPHY CHEST: CPT

## 2017-01-06 PROCEDURE — 84702 CHORIONIC GONADOTROPIN TEST: CPT

## 2017-01-06 PROCEDURE — 99285 EMERGENCY DEPT VISIT HI MDM: CPT | Mod: 25

## 2017-01-06 PROCEDURE — 80307 DRUG TEST PRSMV CHEM ANLYZR: CPT

## 2017-01-06 PROCEDURE — 85610 PROTHROMBIN TIME: CPT

## 2017-01-06 PROCEDURE — 81003 URINALYSIS AUTO W/O SCOPE: CPT

## 2017-01-06 PROCEDURE — 84443 ASSAY THYROID STIM HORMONE: CPT

## 2017-01-06 RX ADMIN — Medication 0.5 MILLIGRAM(S): at 01:03

## 2017-01-09 DIAGNOSIS — G47.00 INSOMNIA, UNSPECIFIED: ICD-10-CM

## 2017-01-09 DIAGNOSIS — F10.10 ALCOHOL ABUSE, UNCOMPLICATED: ICD-10-CM

## 2017-01-09 DIAGNOSIS — R00.0 TACHYCARDIA, UNSPECIFIED: ICD-10-CM

## 2017-01-09 DIAGNOSIS — F68.10 FACTITIOUS DISORDER IMPOSED ON SELF, UNSPECIFIED: ICD-10-CM

## 2017-01-09 DIAGNOSIS — F32.9 MAJOR DEPRESSIVE DISORDER, SINGLE EPISODE, UNSPECIFIED: ICD-10-CM

## 2017-01-09 DIAGNOSIS — G54.0 BRACHIAL PLEXUS DISORDERS: ICD-10-CM

## 2017-01-09 DIAGNOSIS — I82.629 ACUTE EMBOLISM AND THROMBOSIS OF DEEP VEINS OF UNSPECIFIED UPPER EXTREMITY: ICD-10-CM

## 2017-01-09 DIAGNOSIS — E28.2 POLYCYSTIC OVARIAN SYNDROME: ICD-10-CM

## 2017-01-09 DIAGNOSIS — F41.9 ANXIETY DISORDER, UNSPECIFIED: ICD-10-CM

## 2017-01-09 DIAGNOSIS — R06.02 SHORTNESS OF BREATH: ICD-10-CM

## 2017-01-09 DIAGNOSIS — T43.621A POISONING BY AMPHETAMINES, ACCIDENTAL (UNINTENTIONAL), INITIAL ENCOUNTER: ICD-10-CM

## 2017-01-09 DIAGNOSIS — F19.950 OTHER PSYCHOACTIVE SUBSTANCE USE, UNSPECIFIED WITH PSYCHOACTIVE SUBSTANCE-INDUCED PSYCHOTIC DISORDER WITH DELUSIONS: ICD-10-CM

## 2017-01-09 DIAGNOSIS — F12.90 CANNABIS USE, UNSPECIFIED, UNCOMPLICATED: ICD-10-CM

## 2018-05-23 NOTE — ED ADULT NURSE NOTE - PRO TOBACCO QUIT READY
CERTIFICATE OF WORK    May 29, 2018      Re: Maninder Patel  [address] [facility] [address]      This is to certify that Maninder Patel has been under my care from 5/23/2018 and can return to regular work on 6/4/18.    RESTRICTIONS: 10 lb lifting restriction for two weeks      REMARKS: follow-up appointment next week with surgeon        SIGNATURE:___________________________________________,   5/29/2018  Placido Menjivar MD      [facility]  [address] [place] [address]  [phone]             not motivated to quit

## 2019-04-09 NOTE — H&P ADULT. - NS ABD PE RECTAL EXAM
[FreeTextEntry1] : 59M with recurrent SCCa oral cavity presents for follow up. Pt Status post composite resection of the anterior mandible bilateral neck and reconstruction with free fibula flap 4/7/18. S/P implant placement and mucosal flap. PAtient seen in Dr. Dow who debrided mucosal flap due to failure.  He is drooling do to oral incompetence. Lower lip scared back down. \par  not examined

## 2019-12-18 ENCOUNTER — HOSPITAL ENCOUNTER (INPATIENT)
Dept: HOSPITAL 54 - ER | Age: 29
LOS: 2 days | Discharge: HOME | DRG: 197 | End: 2019-12-20
Attending: INTERNAL MEDICINE | Admitting: INTERNAL MEDICINE
Payer: COMMERCIAL

## 2019-12-18 VITALS — WEIGHT: 136 LBS | HEIGHT: 64 IN | BODY MASS INDEX: 23.22 KG/M2

## 2019-12-18 VITALS — DIASTOLIC BLOOD PRESSURE: 98 MMHG | SYSTOLIC BLOOD PRESSURE: 143 MMHG

## 2019-12-18 DIAGNOSIS — F41.9: ICD-10-CM

## 2019-12-18 DIAGNOSIS — I10: ICD-10-CM

## 2019-12-18 DIAGNOSIS — D68.59: ICD-10-CM

## 2019-12-18 DIAGNOSIS — R00.0: ICD-10-CM

## 2019-12-18 DIAGNOSIS — E61.1: ICD-10-CM

## 2019-12-18 DIAGNOSIS — Z86.718: ICD-10-CM

## 2019-12-18 DIAGNOSIS — I82.A12: Primary | ICD-10-CM

## 2019-12-18 LAB
ALBUMIN SERPL BCP-MCNC: 4.5 G/DL (ref 3.4–5)
ALP SERPL-CCNC: 73 U/L (ref 46–116)
ALT SERPL W P-5'-P-CCNC: 57 U/L (ref 12–78)
APPEARANCE UR: CLEAR
AST SERPL W P-5'-P-CCNC: 23 U/L (ref 15–37)
BASOPHILS # BLD AUTO: 0.1 /CMM (ref 0–0.2)
BASOPHILS NFR BLD AUTO: 0.8 % (ref 0–2)
BILIRUB DIRECT SERPL-MCNC: 0.1 MG/DL (ref 0–0.2)
BILIRUB SERPL-MCNC: 0.5 MG/DL (ref 0.2–1)
BILIRUB UR QL STRIP: NEGATIVE
BUN SERPL-MCNC: 10 MG/DL (ref 7–18)
CALCIUM SERPL-MCNC: 9.3 MG/DL (ref 8.5–10.1)
CHLORIDE SERPL-SCNC: 101 MMOL/L (ref 98–107)
CO2 SERPL-SCNC: 29 MMOL/L (ref 21–32)
COLOR UR: YELLOW
CREAT SERPL-MCNC: 0.7 MG/DL (ref 0.6–1.3)
EOSINOPHIL NFR BLD AUTO: 1 % (ref 0–6)
GLUCOSE SERPL-MCNC: 95 MG/DL (ref 74–106)
GLUCOSE UR STRIP-MCNC: NEGATIVE MG/DL
HCT VFR BLD AUTO: 44 % (ref 33–45)
HGB BLD-MCNC: 14.7 G/DL (ref 11.5–14.8)
HGB UR QL STRIP: NEGATIVE ERY/UL
KETONES UR STRIP-MCNC: 40 MG/DL
LEUKOCYTE ESTERASE UR QL STRIP: NEGATIVE
LYMPHOCYTES NFR BLD AUTO: 2.2 /CMM (ref 0.8–4.8)
LYMPHOCYTES NFR BLD AUTO: 24.5 % (ref 20–44)
MCHC RBC AUTO-ENTMCNC: 33 G/DL (ref 31–36)
MCV RBC AUTO: 87 FL (ref 82–100)
MONOCYTES NFR BLD AUTO: 0.6 /CMM (ref 0.1–1.3)
MONOCYTES NFR BLD AUTO: 6.9 % (ref 2–12)
NEUTROPHILS # BLD AUTO: 6 /CMM (ref 1.8–8.9)
NEUTROPHILS NFR BLD AUTO: 66.8 % (ref 43–81)
NITRITE UR QL STRIP: NEGATIVE
PH UR STRIP: 7.5 [PH] (ref 5–8)
PLATELET # BLD AUTO: 290 /CMM (ref 150–450)
POTASSIUM SERPL-SCNC: 3.8 MMOL/L (ref 3.5–5.1)
PROT SERPL-MCNC: 8.2 G/DL (ref 6.4–8.2)
PROT UR QL STRIP: NEGATIVE MG/DL
RBC # BLD AUTO: 5.09 MIL/UL (ref 4–5.2)
SODIUM SERPL-SCNC: 140 MMOL/L (ref 136–145)
UROBILINOGEN UR STRIP-MCNC: 0.2 EU/DL
WBC NRBC COR # BLD AUTO: 9 K/UL (ref 4.3–11)

## 2019-12-18 PROCEDURE — G0378 HOSPITAL OBSERVATION PER HR: HCPCS

## 2019-12-18 NOTE — NUR
MS RN ADMISSION NOTES

ADMITTED THIS 28 YO FEMALE FROM ER,WITH DX THROMBUS LEFT AXILLAR VEIN.ALERT,ORIENTED 
X4,AMBULATORY,SALINE LOCK LEFT AC INTACT AND PATENT.NOTED FACIAL SKIN IRRITATION.PER PATIENT 
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 
UP,CALL LIGHT IN REACH,NEEDS ANTICIPATED.

## 2019-12-18 NOTE — NUR
[id number], from home, hyperventilating, anxious, took lorazepam 1 mg 30mins PTA 
and not helping per patient. PT AAOX4, VSS. RR EVEN & UNLABORED. DENIES CP, 
SOB, DIZZINESS, N/V @ THIS TIME. AWAITING EVAL BY ERMD & WILL CONT TO MONITOR.

## 2019-12-18 NOTE — NUR
PT SITTING UP ON HER LAPTOP RR EVEN & UNLABORED. DENIES CP, SOB, DIZZINESS, N/V 
@ THIS TIME & WILL CONT TO MONITOR.

## 2019-12-19 VITALS — DIASTOLIC BLOOD PRESSURE: 68 MMHG | SYSTOLIC BLOOD PRESSURE: 124 MMHG

## 2019-12-19 VITALS — DIASTOLIC BLOOD PRESSURE: 98 MMHG | SYSTOLIC BLOOD PRESSURE: 143 MMHG

## 2019-12-19 VITALS — SYSTOLIC BLOOD PRESSURE: 141 MMHG | DIASTOLIC BLOOD PRESSURE: 91 MMHG

## 2019-12-19 VITALS — SYSTOLIC BLOOD PRESSURE: 129 MMHG | DIASTOLIC BLOOD PRESSURE: 89 MMHG

## 2019-12-19 LAB
BASOPHILS # BLD AUTO: 0.1 /CMM (ref 0–0.2)
BASOPHILS NFR BLD AUTO: 0.6 % (ref 0–2)
BUN SERPL-MCNC: 14 MG/DL (ref 7–18)
CALCIUM SERPL-MCNC: 8.9 MG/DL (ref 8.5–10.1)
CHLORIDE SERPL-SCNC: 105 MMOL/L (ref 98–107)
CHOLEST SERPL-MCNC: 211 MG/DL (ref ?–200)
CO2 SERPL-SCNC: 30 MMOL/L (ref 21–32)
CREAT SERPL-MCNC: 0.9 MG/DL (ref 0.6–1.3)
EOSINOPHIL NFR BLD AUTO: 2 % (ref 0–6)
FERRITIN SERPL-MCNC: 29 NG/ML (ref 8–388)
GLUCOSE SERPL-MCNC: 94 MG/DL (ref 74–106)
HCT VFR BLD AUTO: 43 % (ref 33–45)
HDLC SERPL-MCNC: 75 MG/DL (ref 40–60)
HGB BLD-MCNC: 14.6 G/DL (ref 11.5–14.8)
IRON SERPL-MCNC: 142 UG/DL (ref 50–175)
LDLC SERPL DIRECT ASSAY-MCNC: 120 MG/DL (ref 0–99)
LYMPHOCYTES NFR BLD AUTO: 3.5 /CMM (ref 0.8–4.8)
LYMPHOCYTES NFR BLD AUTO: 39.7 % (ref 20–44)
MAGNESIUM SERPL-MCNC: 2.1 MG/DL (ref 1.8–2.4)
MCHC RBC AUTO-ENTMCNC: 34 G/DL (ref 31–36)
MCV RBC AUTO: 86 FL (ref 82–100)
MONOCYTES NFR BLD AUTO: 1 /CMM (ref 0.1–1.3)
MONOCYTES NFR BLD AUTO: 11.1 % (ref 2–12)
NEUTROPHILS # BLD AUTO: 4.1 /CMM (ref 1.8–8.9)
NEUTROPHILS NFR BLD AUTO: 46.6 % (ref 43–81)
PHOSPHATE SERPL-MCNC: 4.8 MG/DL (ref 2.5–4.9)
PLATELET # BLD AUTO: 255 /CMM (ref 150–450)
POTASSIUM SERPL-SCNC: 4.1 MMOL/L (ref 3.5–5.1)
RBC # BLD AUTO: 5.04 MIL/UL (ref 4–5.2)
SODIUM SERPL-SCNC: 142 MMOL/L (ref 136–145)
TIBC SERPL-MCNC: 411 UG/DL (ref 250–450)
TRIGL SERPL-MCNC: 54 MG/DL (ref 30–150)
WBC NRBC COR # BLD AUTO: 8.8 K/UL (ref 4.3–11)

## 2019-12-19 RX ADMIN — ALBUTEROL SULFATE SCH MG: 1.25 SOLUTION RESPIRATORY (INHALATION) at 20:12

## 2019-12-19 RX ADMIN — APIXABAN SCH MG: 5 TABLET, FILM COATED ORAL at 16:58

## 2019-12-19 RX ADMIN — ALPRAZOLAM SCH MG: 0.25 TABLET ORAL at 14:45

## 2019-12-19 RX ADMIN — APIXABAN SCH MG: 5 TABLET, FILM COATED ORAL at 09:06

## 2019-12-19 RX ADMIN — ALPRAZOLAM SCH MG: 0.25 TABLET ORAL at 16:57

## 2019-12-19 RX ADMIN — ALBUTEROL SULFATE SCH MG: 1.25 SOLUTION RESPIRATORY (INHALATION) at 11:21

## 2019-12-19 RX ADMIN — ALBUTEROL SULFATE SCH MG: 1.25 SOLUTION RESPIRATORY (INHALATION) at 15:21

## 2019-12-19 RX ADMIN — ALBUTEROL SULFATE SCH MG: 1.25 SOLUTION RESPIRATORY (INHALATION) at 08:33

## 2019-12-19 NOTE — NUR
MS RN CLOSING NOTES

Patient asleep and resting in bed. A/O x 4. VS stable with no acute distress. Breathing even 
and unlabored on room air with no respiratory distress. Denies pain. 20g PIV on LAC clean 
and intact. Safety precautions in place. Bed locked and set to lowest position with side 
rails x 2 up. All needs rendered at this time. Call light within reach. Will endorse plan of 
care to oncoming shift.

## 2019-12-19 NOTE — NUR
MS RN OPENING NOTES



Patient is currently resting in bed comfortable, a/o x4. Stable on room air breathing equal 
and uneven. No apparent distress noted. No complaints of discomfort or pain. IV located on L 
AC saline lock. Safety precautions in place with bed in lowest position, side rails up x2, 
breaks on, and call light within reach. Will continue to monitor.

## 2019-12-19 NOTE — NUR
MS RN NOTES

Patient stated that she is feeling anxious and requesting medication for anxiety. Notified 
Cathie CHING. Per MD, order Xanax 0.25mg PO BID. Order noted and carried out. Will continue 
to monitor.

## 2019-12-19 NOTE — NUR
MS RN OPENING NOTES

Received Patient awake and resting in bed. A/O x 4. VS stable with no acute distress. 
Breathing even and unlabored on room air with no respiratory distress. Denies pain. 20g PIV 
on LAC clean and intact. Patient stated mild discomfort at site. Will continue to monitor. 
Safety precautions in place. Bed locked and set to lowest position with side rails x 2 up. 
All needs rendered at this time. Call light within reach. Will continue to monitor.

## 2019-12-19 NOTE — NUR
MS RN NOTES

FAIRLY RESTED,SLEPT WELL WITH AMBIEN,NO COMPLAINTS OF PAIN.IN NO ACUTE DISTRESS.WILL ENDORSE 
TO DAY NURSE FOR CARLOS.

## 2019-12-20 VITALS — DIASTOLIC BLOOD PRESSURE: 92 MMHG | SYSTOLIC BLOOD PRESSURE: 134 MMHG

## 2019-12-20 RX ADMIN — ALPRAZOLAM SCH MG: 0.25 TABLET ORAL at 09:01

## 2019-12-20 RX ADMIN — ALBUTEROL SULFATE SCH MG: 1.25 SOLUTION RESPIRATORY (INHALATION) at 08:40

## 2019-12-20 RX ADMIN — APIXABAN SCH MG: 5 TABLET, FILM COATED ORAL at 09:02

## 2019-12-20 RX ADMIN — ALBUTEROL SULFATE SCH MG: 1.25 SOLUTION RESPIRATORY (INHALATION) at 12:38

## 2019-12-20 NOTE — NUR
MS RN OPENING NOTES

Received Patient asleep and resting in bed. A/O x 4. VS stable with no acute distress. 
Breathing even and unlabored on room air with no respiratory distress. Denies pain. 20g PIV 
on LAC clean and intact. Safety precautions in place. Bed locked and set to lowest position 
with side rails x 2 up. All needs rendered at this time. Call light within reach. Will 
continue to monitor.

## 2019-12-20 NOTE — NUR
MS RN CLOSING NOTES



Patiently is currently resting in bed a/o x4, able to make needs known. She is ambulatory, 
no sign fo acute distress noted, and no complaints of pain or discomfort throughout the 
night. IV located on L AC #20 SL. Safety precautions in place with bed in lowest position, 
breaks on, and call light within reach. Will endorse to oncoming shift about CARLOS.

## 2019-12-20 NOTE — NUR
MS RN DISCHARGE NOTES

Patient discharged for home at this time. Patient in stable condition. VS stable with no 
acute distress. Breathing even and unlabored on room air with no respiratory distress. 
Denies pain. Skin intact. Medication reconciliation and discharge orders reviewed and 
explained to Patient. Patient verbalized understanding. All belongings with Patient. Patient 
will follow up with primary MD. Escorted Patient to the Lobby for safety. Patient picked up 
by Friend.

## 2019-12-21 LAB
AT III AG PPP IA-ACNC: 84 % (ref 72–124)
CARDIOLIPIN IGG SER IA-ACNC: <9 GPL U/ML (ref 0–14)
CARDIOLIPIN IGM SER IA-ACNC: <9 MPL U/ML (ref 0–12)
LA NT DPL PPP: 44.4 SEC (ref 0–55)
LA NT DPL/LA NT HPL PPP-RTO: 0.96 RATIO (ref 0–1.4)
PROT C PPP-ACNC: 97 % (ref 73–180)
SCREEN DRVVT: 37.4 SEC (ref 0–47)
THROMBIN TIME: 20.4 SEC (ref 0–23)

## 2021-07-30 NOTE — ED PROVIDER NOTE - CPE EDP SKIN NORM
----- Message from Juwan Osborne MD sent at 7/29/2021  4:43 PM CDT -----  Call patient.  Diabetic A1c level ok at 7.1.  LDL cholesterol level good.  Kidney and liver function ok.  WBC count was normal.  Very mild anemia.     normal...
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
## 2022-09-10 NOTE — PROGRESS NOTE ADULT - ASSESSMENT
[facility]  Hospitalist History and Physical    Name: Joselito Padilla    MRN: [id number]  YOB: 1970    Age: 51 year old  Date of Admission:  9/10/2022  Physician:  Omar Costa DO, FHM  Securely message with the Vocera Web Console (learn more here)  Text page via [employer] Paging/Directory     Assessment & Plan   Joselito Padilla is a 51 year old male transferred from [facility] for possible thrombectomy.  Recent COVID-19 mid August, recovered and traveled on airplane.  Returned and was feeling well until a couple days before presentation to OSH with dyspnea/chest tightness.  Found to have extensive PE on CT and right sided heart changes.  He was placed on IV Heparin.  He was setup for transfer for thrombectomy.  As no beds were available he remained in the [facility] ED for another day.  He transferred to [facility] this AM.  Since his time arriving at the OSH he now feels better.  He hasn't walked much though so he isn't sure how his SOB is with activity.    Extensive pulmonary emboli likely related to recent COVID-19 infection:  -  Discussed case with Interventional Radiologist.  While patient has improved, there may still be benefit from a thrombectomy though the procedure also has risks.  He requested a STAT venous LE ultrasound to assess ongoing clot burden, possible IVC filter need.  Echo already done and shows some right heart strain/moderate pressure.  Normal LVEF.   In addition, will plan to have the patient move around in room with nursing and assess tachycardia, resp rate, oxygenation and see if he is still symptomatic.  If he is significantly symptomatic it is felt thrombectomy would then potentially be more helpful.    -  Continue heparin drip, pharmacy consulted.  Consider DOAC depending on thrombectomy plan.  Pharmacy cost assessment requested.  -  Has a FH of uncle dying of PE.  Doubt familial hypercoag state, much more likely COVID related but given history it may be  reasonable to pursue basic hypercoag workup outpatient once further out from acute PE.  -  Initial IMC status, will consider de-escalating later today depending on further assessment/status    ADDENDUM:  Significant right leg DVT burden.  Discussed with IR.  They will speak with the patient about risks and benefits of thrombectomy + possible IVC filter.   Continue NPO status for possible procedure.  Continue IV heparin, once procedures completed consider oral anticoagulants tomorrow.    Recent COVID-19:  -  Recovered status, now about a month out (original diagnosis 8/12).  Ongoing isolation not indicated.    HTN:  -  Hold lisinopril and hydrochlorothiazide for now, see how BP trends with clot.    Depression:  -  Continue abilify        COVID Status/Screening:  (Recent Infection 8/12 - COVID RECOVERED STATUS)  COVID-19 PCR Results    COVID-19 PCR Results 9/8/22   SARS CoV2 PCR Positive (A)   (A) Abnormal value       Comments are available for some flowsheets but are not being displayed.         COVID-19 Antibody Results, Testing for Immunity    COVID-19 Antibody Results, Testing for Immunity   No data to display.            DVT Prophylaxis: Heparin drip  Code Status: Full Code  Chaudhary Catheter: Not present  Central Lines: None  Cardiac Monitoring: ACTIVE order. Indication: Pulmonaryemboli    Disposition: Expect at least 2 night hospital stay.    Primary Care Physician   Bhaskar Maldonado, [phone]    Chief Complaint   Pulmonary emboli    History is obtained from the patient/transferring facility records.    History of Present Illness   51 year old male transferred from [facility] for possible thrombectomy.  Recent COVID-19 mid August, recovered and traveled on airplane.  Returned and was feeling well until a couple days before presentation to OSH with dyspnea/chest tightness.  Found to have extensive PE on CT and right sided heart changes.  He was placed on IV Heparin.  He was setup for transfer for thrombectomy.  As no beds  26F with a medical history of thoracic outlet syndrome (s/p pectoral resection and cervical rib resection, 11/16), unspecified anxiety disorder, major depressive disorder (on Zoloft, reported Seroquel to another provider), DVT of the upper extremity, and recreational substance use who presented to [facility] for evaluation of shortness of breath and palpitations, likely secondary to overdose with hallucinogenic amphetamine derivatives. were available he remained in the [facility] ED for another day.  He transferred to [facility] this AM.  Since his time arriving at the OSH he now feels better.  He hasn't walked much though so he isn't sure how his SOB is with activity.  No prior history of PE/DVT, CAD, Cancer.     Past Medical History    Past Medical History:   Diagnosis Date     Achilles tendon pain 11/25/2013     ADHD      CARDIOVASCULAR SCREENING; LDL GOAL LESS THAN 130 11/12/2014     Depressive disorder      Hypertension goal BP (blood pressure) < 140/90 11/25/2013     Sleep apnea - not wearing CPAP for a couple months as machine on recall and awaiting new one.          Past Surgical History   Past Surgical History:   Procedure Laterality Date     DENTAL SURGERY  1989    [place] teeth     heel surgery      bilateral     VASECTOMY  2007        Prior to Admission Medications   Prior to Admission Medications   Prescriptions Last Dose Informant Patient Reported? Taking?   ARIPiprazole (ABILIFY) 2 MG tablet 9/8/2022 at AM  Yes No   Sig: Take 2 mg by mouth daily Take in addition to 5 mg tablet for total daily dose of 7 mg   ARIPiprazole (ABILIFY) 5 MG tablet 9/8/2022 at AM  Yes No   Sig: Take 5 mg by mouth daily Take in addition to 2 mg tablet for total daily dose of 7 mg   Multiple Vitamins-Minerals (MULTIVITAMIN ADULT PO) 9/8/2022  Yes No   PRISTIQ 100 MG TB24 24 hr tablet 9/8/2022  Yes No   Sig: Take 100 mg by mouth daily   atomoxetine (STRATTERA) 80 MG capsule 9/8/2022 at AM  Yes No   Sig: Take 80 mg by mouth daily   hydrochlorothiazide (HYDRODIURIL) 12.5 MG tablet 9/8/2022 at AM  No No   Sig: TAKE 1 TABLET(12.5 MG) BY MOUTH DAILY   Patient taking differently: Take 12.5 mg by mouth daily TAKE 1 TABLET(12.5 MG) BY MOUTH DAILY   ibuprofen (ADVIL/MOTRIN) 200 MG tablet prn  Yes No   Sig: Take 400-600 mg by mouth every 6 hours as needed for moderate pain   lisinopril (ZESTRIL) 10 MG tablet 9/8/2022 at AM  No No   Sig: TAKE 1 TABLET(10 MG) BY MOUTH DAILY       Facility-Administered Medications: None     Allergies   No Known Allergies    Social History   Social History     Tobacco Use     Smoking status: Never Smoker     Smokeless tobacco: Never Used   Substance Use Topics     Alcohol use: No     Alcohol/week: 0.0 standard drinks     Social History     Social History Narrative    He eats fruits and vegetables every day. Increased dairy recommended. Vitamin D supplement recommended.       Family History   Reviewed, uncle with PE that he reportedly  from.      Review of Systems   A Comprehensive greater than 10 system review of systems was carried out.  Pertinent positives and negatives are noted above.  Otherwise negative for contributory information.    Physical Exam                      Vital Signs with Ranges  Pulse:  [59-91] 91  Resp:  [11-22] 16  BP: (101-156)/() 153/107  SpO2:  [90 %-100 %] 97 %  0 lbs 0 oz    GEN:  Alert, oriented x 3, appears ill but comfortable, no overt distress.  HEENT:  Normocephalic/atraumatic, no scleral icterus, no nasal discharge, mouth moist.  CV:  Borderline tachy with rate near 100 when moving around in bed.  No loud murmur/rub..  LUNGS:  Clear to auscultation bilaterally without rales/rhonchi/wheezing/retractions.  Symmetric chest rise on inhalation noted.  ABD:  Active bowel sounds, soft, non-tender/non-distended.  No rebound/guarding/rigidity.  EXT:  Trace LE edema.  No cyanosis.  No acute joint synovitis noted.  SKIN:  Dry to touch, no exanthems noted in the visualized areas.  NEURO:  Moving extremities well in bed.  Sensation to touch grossly intact.  No new focal deficits appreciated.      Data   Data reviewed today:  I personally reviewed no images or EKG's today.    Recent Labs   Lab 09/10/22  0844 22  1121   WBC 7.7 6.3   HGB 17.3 15.3   HCT 54.3* 46.7   MCV 88 86    202     Recent Labs   Lab 09/10/22  1023 09/10/22  0844 22  1626 22  1121   NA  --  139 138 136   POTASSIUM 4.6 5.9* 4.0 3.8    CHLORIDE  --  105 100 102   CO2  --  22 25 29   ANIONGAP  --  12 13 5   GLC  --  112* 88 103*   BUN  --  14.3 12.8 15   CR  --  0.80 1.10 1.08   GFRESTIMATED  --  >90 81 83   TIMI  --  9.6 9.5 9.9   PROTTOTAL  --   --   --  7.8   ALBUMIN  --   --   --  4.0   BILITOTAL  --   --   --  0.4   ALKPHOS  --   --   --  75   AST  --   --   --  23   ALT  --   --   --  42       Recent Results (from the past 24 hour(s))   Echocardiogram Complete   Result Value    LVEF  60-65%    Narrative    [id number]  [id number]  [id number]  [id number]^JENISE^NIDHI^COLLEEN     [facility]  Echocardiography Laboratory  [address] [place] [address]     Name: SUSANA FERNANDES  MRN: [id number]  : 1970  Study Date: 2022 02:44 PM  Age: 51 yrs  Gender: Male  Patient Location: [facility]  Reason For Study: Pulmonary Embolism  Ordering Physician: NIDHI BURDEN  Performed By: Camilla Escobedo     BSA: 2.3 m2  Height: 69 in  Weight: 246 lb  HR: 80  BP: 143/102 mmHg  ______________________________________________________________________________  Procedure  Complete Portable Echo Adult. Optison (NDC #[id number]) given intravenously.  ______________________________________________________________________________  Interpretation Summary     Technically difficult imaging  Moderately decreased right ventricular systolic function ( best seen on short  axis parasternal veiws.)  The right ventricle is mild to moderately dilated.( best seen on short axis  parasternal views)  Flattened septum is consistent with RV pressure overload.  Left ventricular systolic function is normal.  The visual ejection fraction is 60-65%.  The left ventricle is normal in size.  Doppler interrogation does not demonstrate signficant stenosis or  insufficinecy involving cardiac valves.     No old studies available for comparison     ______________________________________________________________________________  Left Ventricle  The left ventricle is  normal in size. There is normal left ventricular wall  thickness. Left ventricular systolic function is normal. The visual ejection  fraction is 60-65%. Flattened septum is consistent with RV pressure overload.  There is no thrombus seen in the left ventricle.     Right Ventricle  The right ventricle is mild to moderately dilated. Moderately decreased right  ventricular systolic function. There is no mass or thrombus in the right  ventricle.     Atria  Normal left atrial size. Right atrial size is normal. There is no atrial shunt  seen. The left atrial appendage is not well visualized.     Mitral Valve  The mitral valve leaflets appear normal. There is no evidence of stenosis,  fluttering, or prolapse. There is no mitral regurgitation noted. There is no  mitral valve stenosis.     Tricuspid Valve  Normal tricuspid valve. No tricuspid regurgitation. Right ventricular systolic  pressure could not be approximated due to inadequate tricuspid regurgitation.  There is no tricuspid stenosis.     Aortic Valve  The aortic valve is trileaflet. No aortic regurgitation is present. No aortic  stenosis is present.     Pulmonic Valve  Normal pulmonic valve. There is no pulmonic valvular regurgitation. Increased  pulmonic valve velocity.     Vessels  The aortic root is normal size. Normal size ascending aorta. The inferior vena  cava is normal. The pulmonary artery is normal size.     Pericardium  The pericardium appears normal. There is no pleural effusion.     Rhythm  Sinus rhythm was noted.  ______________________________________________________________________________  MMode/2D Measurements & Calculations     IVSd: 0.87 cm  LVIDd: 4.2 cm  LVIDs: 2.7 cm  LVPWd: 1.0 cm  FS: 35.2 %  LV mass(C)d: 125.2 grams  LV mass(C)dI: 55.5 grams/m2  Ao root diam: 3.2 cm  LA dimension: 3.5 cm  asc Aorta Diam: 3.5 cm  LA/Ao: 1.1  LVOT diam: 2.0 cm  LVOT area: 3.1 cm2  LA Volume (BP): 35.8 ml  LA Volume Index (BP): 15.8 ml/m2  RWT: 0.48      Doppler Measurements & Calculations  MV E max brandan: 66.1 cm/sec  MV A max brandan: 61.2 cm/sec  MV E/A: 1.1  MV dec time: 0.17 sec  LV V1 max P.3 mmHg  LV V1 max: 104.0 cm/sec  LV V1 VTI: 18.3 cm  SV(LVOT): 57.5 ml  SI(LVOT): 25.5 ml/m2  PA acc time: 0.08 sec  E/E' av.1  Lateral E/e': 5.1  Medial E/e': 9.1     ______________________________________________________________________________  Report approved by: Dr. Sj Noriega 2022 03:42 PM

## 2022-11-14 NOTE — ED ADULT NURSE NOTE - ED CARDIAC CAPILLARY REFILL
Patient Name: Barb Duckworth  1946    Dear Andrew Glass MD,    I had the pleasure of seeing your patient for diabetic examination on 11/14/2022.  The results of your patient's eye exam are shown below.    Eyes were dilated on 11/14/2022.    Retinal Examination Findings:     Right Eye:  no retinopathy    Left Eye:  no retinopathy    Plan: observation; follow up in 1 year.    We discussed importance of good blood sugar control and target hemoglobin A1C of 7.0 or less. I reinforced the need to comply with the current systemic diabetic treatment regimen.       Dunia Lala, OD  [address] [facility] [id number] [phone]   2 seconds or less

## 2023-08-04 NOTE — H&P ADULT. - NEUROLOGICAL DETAILS
normal, alert, pleasant, well nourished, in no acute distress, well developed, well nourished, ambulating without difficulty , well developed, well nourished , in no acute distress , ambulating without difficulty , normal communication ability
cranial nerves intact/alert and oriented x 3